# Patient Record
Sex: MALE | Race: WHITE
[De-identification: names, ages, dates, MRNs, and addresses within clinical notes are randomized per-mention and may not be internally consistent; named-entity substitution may affect disease eponyms.]

---

## 2017-10-30 ENCOUNTER — HOSPITAL ENCOUNTER (INPATIENT)
Dept: HOSPITAL 31 - C.ER | Age: 62
LOS: 44 days | Discharge: HOME | DRG: 177 | End: 2017-12-13
Attending: INTERNAL MEDICINE | Admitting: INTERNAL MEDICINE
Payer: MEDICAID

## 2017-10-30 DIAGNOSIS — J18.9: ICD-10-CM

## 2017-10-30 DIAGNOSIS — A15.7: Primary | ICD-10-CM

## 2017-10-30 DIAGNOSIS — I10: ICD-10-CM

## 2017-10-30 DIAGNOSIS — F43.22: ICD-10-CM

## 2017-10-30 DIAGNOSIS — Z87.891: ICD-10-CM

## 2017-10-30 DIAGNOSIS — J45.909: ICD-10-CM

## 2017-10-30 DIAGNOSIS — K80.20: ICD-10-CM

## 2017-10-30 DIAGNOSIS — Z78.9: ICD-10-CM

## 2017-10-30 DIAGNOSIS — R04.2: ICD-10-CM

## 2017-10-30 LAB
ALBUMIN/GLOB SERPL: 1.1 {RATIO} (ref 1–2.1)
ALP SERPL-CCNC: 82 U/L (ref 38–126)
ALT SERPL-CCNC: 34 U/L (ref 21–72)
APTT BLD: 38 SECONDS (ref 21–34)
AST SERPL-CCNC: 33 U/L (ref 17–59)
BASE EXCESS BLDV CALC-SCNC: -1.3 MMOL/L (ref 0–2)
BASE EXCESS BLDV CALC-SCNC: 2.4 MMOL/L (ref 0–2)
BASOPHILS # BLD AUTO: 0.1 K/UL (ref 0–0.2)
BASOPHILS NFR BLD: 0.7 % (ref 0–2)
BILIRUB SERPL-MCNC: 0.8 MG/DL (ref 0.2–1.3)
BILIRUB UR-MCNC: NEGATIVE MG/DL
BUN SERPL-MCNC: 17 MG/DL (ref 9–20)
CALCIUM SERPL-MCNC: 9.2 MG/DL (ref 8.6–10.4)
CAOX CRY #/AREA URNS HPF: (no result) /HPF
CHLORIDE SERPL-SCNC: 96 MMOL/L (ref 98–107)
CO2 SERPL-SCNC: 23 MMOL/L (ref 22–30)
EOSINOPHIL # BLD AUTO: 0 K/UL (ref 0–0.7)
EOSINOPHIL NFR BLD: 0 % (ref 0–4)
ERYTHROCYTE [DISTWIDTH] IN BLOOD BY AUTOMATED COUNT: 13.1 % (ref 11.5–14.5)
GLOBULIN SER-MCNC: 3.8 GM/DL (ref 2.2–3.9)
GLUCOSE SERPL-MCNC: 111 MG/DL (ref 75–110)
GLUCOSE UR STRIP-MCNC: NORMAL MG/DL
HCT VFR BLD CALC: 37.7 % (ref 35–51)
HYALINE CASTS #/AREA URNS LPF: (no result) /LPF (ref 0–2)
INR PPP: 1.2
KETONES UR STRIP-MCNC: NEGATIVE MG/DL
LEUKOCYTE ESTERASE UR-ACNC: (no result) LEU/UL
LYMPHOCYTES # BLD AUTO: 1.4 K/UL (ref 1–4.3)
LYMPHOCYTES NFR BLD AUTO: 11.3 % (ref 20–40)
MAGNESIUM SERPL-MCNC: 1.8 MG/DL (ref 1.6–2.3)
MCH RBC QN AUTO: 29.5 PG (ref 27–31)
MCHC RBC AUTO-ENTMCNC: 34.4 G/DL (ref 33–37)
MCV RBC AUTO: 85.6 FL (ref 80–94)
MONOCYTES # BLD: 1.2 K/UL (ref 0–0.8)
MONOCYTES NFR BLD: 10 % (ref 0–10)
NRBC BLD AUTO-RTO: 0 % (ref 0–2)
PCO2 BLDV: 32 MMHG (ref 40–60)
PCO2 BLDV: 43 MMHG (ref 40–60)
PH BLDV: 7.36 [PH] (ref 7.32–7.43)
PH BLDV: 7.5 [PH] (ref 7.32–7.43)
PH UR STRIP: 5 [PH] (ref 5–8)
PHOSPHATE SERPL-MCNC: 1.4 MG/DL (ref 2.5–4.5)
PLATELET # BLD: 323 K/UL (ref 130–400)
PMV BLD AUTO: 7.6 FL (ref 7.2–11.7)
POTASSIUM SERPL-SCNC: 3.4 MMOL/L (ref 3.6–5.2)
PROT SERPL-MCNC: 8.2 G/DL (ref 6.3–8.3)
PROT UR STRIP-MCNC: NEGATIVE MG/DL
RBC # UR STRIP: (no result) /UL
RBC #/AREA URNS HPF: 1 /HPF (ref 0–3)
SODIUM SERPL-SCNC: 131 MMOL/L (ref 132–148)
SP GR UR STRIP: 1 (ref 1–1.03)
UROBILINOGEN UR-MCNC: NORMAL MG/DL (ref 0.2–1)
WBC # BLD AUTO: 12.3 K/UL (ref 4.8–10.8)

## 2017-10-30 RX ADMIN — ALBUTEROL SULFATE PRN MG: 2.5 SOLUTION RESPIRATORY (INHALATION) at 21:45

## 2017-10-30 RX ADMIN — TAZOBACTAM SODIUM AND PIPERACILLIN SODIUM SCH MLS/HR: 375; 3 INJECTION, SOLUTION INTRAVENOUS at 21:48

## 2017-10-30 NOTE — C.PDOC
History Of Present Illness


62 year old male presents to the ED for evaluation of cough and hemoptysis 

which began 2 days ago. Patient notes he had multiple episodes, with last 

episode occurring prior to arrival. Patient notes bright red blood with clots. 

He reports shortness of breath. Patient denies history of asthma or smoking. 

Patient has been compliant with his blood pressure medication. Patient denies 

unintentional weight loss, TB exposure, or recent foreign travel. 





COUGH, HEMOPTYSIS X 2 DAYS. PS MULT EPISODES LAST PTA. +BRB W CLOTS. +SOB. 

DENIES HO ASTHMA, SMOKING. COMPLIANT W BP MEDS. DENIES UNINT WT LOSS, TB 

EXPOSURE, FOREIGN TRAVEL.





EXAM


MOD DIST NONTOXIC


HEENT NEG


LUNGS +ACTIVE WET COUGH, NP. +DIFFUSE RHONCHI R SIDE W TACHYPNEA RETRACTION


CV RRR SINUS TACH


REMAINDER NEG


Time Seen by Provider: 10/30/17 12:24


Chief Complaint (Nursing): Cough, Cold, Congestion


History Per: Patient


History/Exam Limitations: no limitations


Onset/Duration Of Symptoms: Days (2)


Current Symptoms Are (Timing): Still Present


Sick Contacts (Context): None


Associated Symptoms: Cough


Ear Symptoms: Bilateral: None


Additional History Per: Patient





Past Medical History


Reviewed: Historical Data, Nursing Documentation, Vital Signs


Vital Signs: 


 Last Vital Signs











Temp  99.8 F H  10/30/17 17:26


 


Pulse  95 H  10/30/17 17:26


 


Resp  20   10/30/17 17:26


 


BP  146/81   10/30/17 17:26


 


Pulse Ox  99   10/30/17 17:26














- Medical History


PMH: No Chronic Diseases


Surgical History: No Surg Hx


Family History: States: Unknown Family Hx





- Social History


Hx Tobacco Use: No





Review Of Systems


Constitutional: Negative for: Weight loss


Respiratory: Positive for: Cough, Hemoptysis





Physical Exam





- Physical Exam


Appears: Non-toxic, Other (in moderate distress )


Skin: Normal Color, Warm, Dry


Head: Atraumatic, Normacephalic


Eye(s): bilateral: Normal Inspection


Ear(s): Bilateral: Normal


Nose: Normal, No Discharge


Oral Mucosa: Moist


Throat: Normal, No Erythema, No Exudate


Neck: Supple


Chest: Symmetrical, No Deformity, No Tenderness, Other (sinus tachycardia )


Cardiovascular: Rhythm Regular, No Murmur


Respiratory: No Rales, Rhonchi (diffuse, right side ), No Wheezing, Other (

active wet cough, nonproductive, tachypnea, retraction )


Extremity: Normal ROM, Capillary Refill (less than 2 seconds )


Neurological/Psych: Oriented x3, Normal Speech, Normal Cognition


Gait: Steady





ED Course And Treatment





- Laboratory Results


Result Diagrams: 


 10/30/17 12:59





 10/30/17 12:59


ECG: Interpreted By Me


ECG Rhythm: Sinus Rhythm


Rate From EC


O2 Sat by Pulse Oximetry: 94


Pulse Ox Interpretation: Normal





- Radiology


CXR: Interpreted by Me, Viewed By Me, Read By Radiologist


CXR Interpretation: Yes: Other (patchy infiltrates bilaterally )


Progress Note: Labs, CT Angio Chest, CXR, EKG ordered and reviewed. Albuterol 

INH, Maxipime IVP, Tylenol PO and IV fluids administered.





Progress





- Re-Evaluation


Re-evaluation Note: 





10/30/17 17:49


NARD. 98% ON RA. APPEARS COMFORTABLE. 


D/W DR DEMI XIE WILL ADMIT. PENDING CTA REPORT





- Data Reviewed


Data Reviewed: Lab, Diagnostic imaging, EKG, Old records





- Critical Care


Citical Care: Excluding Proc Time


Critical Care Time: 90 minutes





Disposition


Counseled Patient/Family Regarding: Studies Performed, Diagnosis





- Disposition


Disposition: HOSPITALIZED


Disposition Time: 17:52


Condition: STABLE


Forms:  [employer] (English)





- Clinical Impression


Clinical Impression: 


 Pneumonia, Hemoptysis








- Scribe Statement


The provider has reviewed the documentation as recorded by the Scribe (Marta Walsh)


Provider Attestation: 








All medical record entries made by the Scribe were at my direction and 

personally dictated by me. I have reviewed the chart and agree that the record 

accurately reflects my personal performance of the history, physical exam, 

medical decision making, and the department course for this patient. I have 

also personally directed, reviewed, and agree with the discharge instructions 

and disposition.





Decision To Admit





- Pt Status Changed To:


Hospital Disposition Of: Inpatient





- Admit Certification


Admit to Inpatient:: After my assessment, the patient will require 

hospitalization for at least two midnights.  This is because of the severity of 

symptoms shown, intensity of services needed, and/or the medical risk in this 

patient being treated as an outpatient.





- InPatient:


Physician Admission Certification: I certify that this patient requires 2 or 

more midnights of care for the following reason:: SEE NOTE





- .


Bed Request Type: Regular


Admitting Physician: Turner Xie


Patient Diagnosis: 


 Pneumonia, Hemoptysis

## 2017-10-30 NOTE — CT
PROCEDURE:  CT Chest with contrast (Pulmonary Angiogram)



HISTORY:

HEMOPTYSIS  r/o PE



COMPARISON:

None available. 



TECHNIQUE:

Axial computed tomography images were obtained of the chest in the 

pulmonary arterial phase of enhancement. Coronal and sagittal 

reformatted images were created and reviewed.



Intravenous contrast dose: Visipaque 320 100 mL.



Mean Hounsfield unit values in the main pulmonary artery: 245.83 



Radiation dose:



Total exam DLP = 363.92 mGy-cm.



This CT exam was performed using one or more of the following dose 

reduction techniques: Automated exposure control, adjustment of the 

mA and/or kV according to patient size, and/or use of iterative 

reconstruction technique.



FINDINGS:



PULMONARY ARTERIES:

Unremarkable. No pulmonary embolism. 



AORTA:

No acute findings. No thoracic aortic aneurysm. 



LUNGS:

Large, dominant thick-walled cavitary mass apex of the right lung 

with an air-fluid level.  The major component measures 3.4 x 4.2 cm 

in their satellite lesions contiguous with this. Additional benign 

cavitary masses identified in the left upper lobe, superior segment 

of the left lower lobe, and in the posterior segment of the right 

upper lobe. The patient is neither cachectic hand is not known to be 

immunosuppressive medication, nor immunocompromised. Although tumor, 

septic emboli are less likely considerations in all likelihood this 

is inflammatory/infectious perhaps granulomatous disease. 

Reactivation tuberculosis can assume this appearance as CT and 

variants of sarcoid. 



PLEURAL SPACES:

Unremarkable. No effusion or pneuomothorax. 



HEART:

Unremarkable. No cardiomegaly. No significant pericardial effusion. 



LYMPH NODES:

No lymphadenopathy.



BONES, CHEST WALL:

Unremarkable. No fracture or destructive lesion 



OTHER FINDINGS:

Cholelithiasis without CT evidence of acute cholecystitis.  



IMPRESSION:

Primarily upper lobe masses and infiltrates including a dominant 

cavitary mass in the right upper lobe. Strong consideration to 

granulomatous disease/ reactivation tuberculosis. Less likely 

considerations have been provided above.



Cholelithiasis without CT evidence of acute cholecystitis. 



Communication of results: I discussed findings with the attending 

physician in the emergency department at the time of this 

interpretation.

## 2017-10-30 NOTE — RAD
HISTORY:

Sepsis Patient  



COMPARISON:

No prior. 



FINDINGS:



LUNGS:

Limited patchy density is difficult to exclude from bony overlap at 

the left apex.  Clinically correlate further. Follow-up PA and 

lateral radiographs is recommended possible it otherwise CT may be 

helpful.



PLEURA:

No significant pleural effusion identified, no pneumothorax apparent.



CARDIOVASCULAR:

Normal.



OSSEOUS STRUCTURES:

Old healed right 8th rib fracture seen posteriorly.



VISUALIZED UPPER ABDOMEN:

Normal.



OTHER FINDINGS:

None.



IMPRESSION:

Rostral patchy infiltrate left apex versus bony overlap or even bony 

abnormality at the left for 2nd and 3rd ribs. Consider follow-up 

two-view chest radiography or CT. No potential acute right pulmonary 

findings.

## 2017-10-31 LAB
L PNEUMO1 AG UR QL IA: NEGATIVE
PROCALCITONIN SERPL-MCNC: 0.56 NG/ML (ref 0.19–0.49)

## 2017-10-31 RX ADMIN — ALBUTEROL SULFATE PRN MG: 2.5 SOLUTION RESPIRATORY (INHALATION) at 13:24

## 2017-10-31 RX ADMIN — ALBUTEROL SULFATE PRN MG: 2.5 SOLUTION RESPIRATORY (INHALATION) at 07:49

## 2017-10-31 RX ADMIN — TAZOBACTAM SODIUM AND PIPERACILLIN SODIUM SCH MLS/HR: 375; 3 INJECTION, SOLUTION INTRAVENOUS at 05:28

## 2017-10-31 RX ADMIN — TAZOBACTAM SODIUM AND PIPERACILLIN SODIUM SCH MLS/HR: 375; 3 INJECTION, SOLUTION INTRAVENOUS at 12:48

## 2017-10-31 RX ADMIN — TAZOBACTAM SODIUM AND PIPERACILLIN SODIUM SCH MLS/HR: 375; 3 INJECTION, SOLUTION INTRAVENOUS at 21:16

## 2017-10-31 NOTE — CP.PCM.CON
<Supriya Ortega - Last Filed: 10/31/17 14:37>





History of Present Illness





- History of Present Illness


History of Present Illness: 





Pulmonology Consult Note for Dr. Mathew's Service





Reason for consult: Abnormal CXR, hemoptysis 





HPI: 62M with PMHx of HTN presented to the ED for hemoptysis, fever, chills 

that started 10/29/17. He reports having a productive cough x 2 weeks but 

denied any fever, chills. Denied any recent travel or sick contacts. Patient 

admitted to fever, chills, fatigue, headache, wheezing, shortness of breath, 

5lb weight loss over the past month - unintentional. He has never had TB, 

pneumonia in the past, follows closely with PMD. Denied chest pain, abdominal 

pain, n/v/d/c, or urinary symptoms. 





PMHx: HTN


PSHx: Denied


Meds: Denied


All: NKDA


SHx: Denied tobacco, ETOH, and illicit drug use


FHx: Unremarkable 











Past Patient History





- Past Social History


Smoking Status: Never Smoked





- CARDIAC


Hx Cardiac Disorders: Yes


Hx Hypertension: Yes





- PULMONARY


Hx Respiratory Disorders: Yes


Hx Asthma: Yes





- MUSCULOSKELETAL/RHEUMATOLOGICAL


Hx Falls: No





- PSYCHIATRIC


Hx Substance Use: No





- SURGICAL HISTORY


Hx Surgeries: No





Meds


Allergies/Adverse Reactions: 


 Allergies











Allergy/AdvReac Type Severity Reaction Status Date / Time


 


No Known Allergies Allergy   Verified 10/30/17 12:03














- Medications


Medications: 


 Current Medications





Acetaminophen (Tylenol 325mg Tab)  975 mg PO ONCE PRN


   PRN Reason: Fever >100.4 F


   Last Admin: 10/30/17 19:34 Dose:  975 mg


Acetaminophen (Tylenol 325mg Tab)  650 mg PO Q4 [facility]


   Last Admin: 10/31/17 09:07 Dose:  650 mg


Albuterol Sulfate (Albuterol 0.083% Inhal Sol (2.5 Mg/3 Ml) Ud)  2.5 mg INH RQ6 

PRN


   PRN Reason: Shortness of Breath


   Last Admin: 10/31/17 07:49 Dose:  2.5 mg


Piperacillin Sod/Tazobactam Sod (Zosyn 3.375 Gm Iv Premix)  3.375 gm in 50 mls 

@ 100 mls/hr IVPB Q8H [facility]


   Last Admin: 10/31/17 05:28 Dose:  100 mls/hr


Losartan Potassium (Cozaar)  50 mg PO DAILY [facility]


   Last Admin: 10/31/17 09:07 Dose:  50 mg











Physical Exam





- Constitutional


Appears: No Acute Distress





- Head Exam


Head Exam: NORMAL INSPECTION, NORMOCEPHALIC





- Eye Exam


Eye Exam: EOMI, Normal appearance, PERRL


Pupil Exam: NORMAL ACCOMODATION





- ENT Exam


ENT Exam: Mucous Membranes Dry





- Neck Exam


Neck exam: Positive for: Normal Inspection.  Negative for: Lymphadenopathy, 

Thyromegaly





- Respiratory Exam


Respiratory Exam: Prolonged Expiratory Phase, Wheezes





- Cardiovascular Exam


Cardiovascular Exam: Tachycardia





- GI/Abdominal Exam


GI & Abdominal Exam: Normal Bowel Sounds, Soft.  absent: Distended, Tenderness





- Extremities Exam


Extremities exam: Positive for: normal inspection, pedal pulses present.  

Negative for: pedal edema, tenderness





- Back Exam


Back exam: NORMAL INSPECTION





- Neurological Exam


Neurological exam: Alert, CN II-XII Intact, Oriented x3





- Psychiatric Exam


Psychiatric exam: Normal Affect, Normal Mood





- Skin


Skin Exam: Dry, Intact, Normal Color, Warm





Results





- Vital Signs


Recent Vital Signs: 


 Last Vital Signs











Temp  99.3 F   10/31/17 00:33


 


Pulse  86   10/31/17 08:06


 


Resp  20   10/31/17 00:33


 


BP  121/71   10/31/17 00:33


 


Pulse Ox  95   10/31/17 00:33














- Labs


Result Diagrams: 


 10/30/17 12:59





 10/30/17 12:59


Labs: 


 Laboratory Results - last 24 hr











  10/30/17 10/30/17 10/30/17





  12:46 12:53 12:59


 


WBC    12.3 H


 


RBC    4.40


 


Hgb    13.0


 


Hct    37.7


 


MCV    85.6


 


MCH    29.5


 


MCHC    34.4


 


RDW    13.1


 


Plt Count    323


 


MPV    7.6


 


Neut % (Auto)    78.0 H


 


Lymph % (Auto)    11.3 L


 


Mono % (Auto)    10.0


 


Eos % (Auto)    0.0


 


Baso % (Auto)    0.7


 


Neut #    9.6 H


 


Lymph #    1.4


 


Mono #    1.2 H


 


Eos #    0.0


 


Baso #    0.1


 


PT   


 


INR   


 


APTT   


 


pO2   57 H 


 


VBG pH   7.50 H 


 


VBG pCO2   32 L 


 


VBG HCO3   26.6 


 


VBG Total CO2   26.0 


 


VBG O2 Sat (Calc)   94.3 H 


 


VBG Base Excess   2.4 H 


 


VBG Potassium   3.4 L 


 


Sodium   133.0 


 


Chloride   103.0 


 


Glucose   124 H 


 


Lactate   0.9 


 


Potassium   


 


Carbon Dioxide   


 


Anion Gap   


 


BUN   


 


Creatinine   


 


Est GFR ( Amer)   


 


Est GFR (Non-Af Amer)   


 


Random Glucose   


 


Calcium   


 


Phosphorus   


 


Magnesium   


 


Total Bilirubin   


 


AST   


 


ALT   


 


Alkaline Phosphatase   


 


Troponin I   


 


NT-Pro-B Natriuret Pep   


 


Total Protein   


 


Albumin   


 


Globulin   


 


Albumin/Globulin Ratio   


 


Venous Blood Potassium   3.4 L 


 


Urine Color   


 


Urine Clarity   


 


Urine pH   


 


Ur Specific Gravity   


 


Urine Protein   


 


Urine Glucose (UA)   


 


Urine Ketones   


 


Urine Blood   


 


Urine Nitrate   


 


Urine Bilirubin   


 


Urine Urobilinogen   


 


Ur Leukocyte Esterase   


 


Urine RBC (Auto)   


 


Calcium Oxalate Crystal   


 


Hyaline Casts   


 


Influenza Typ A,B (EIA)  Negative for flu a/b  














  10/30/17 10/30/17 10/30/17





  12:59 12:59 14:42


 


WBC   


 


RBC   


 


Hgb   


 


Hct   


 


MCV   


 


MCH   


 


MCHC   


 


RDW   


 


Plt Count   


 


MPV   


 


Neut % (Auto)   


 


Lymph % (Auto)   


 


Mono % (Auto)   


 


Eos % (Auto)   


 


Baso % (Auto)   


 


Neut #   


 


Lymph #   


 


Mono #   


 


Eos #   


 


Baso #   


 


PT  13.8 H  


 


INR  1.2  


 


APTT  38 H  


 


pO2   


 


VBG pH   


 


VBG pCO2   


 


VBG HCO3   


 


VBG Total CO2   


 


VBG O2 Sat (Calc)   


 


VBG Base Excess   


 


VBG Potassium   


 


Sodium   131 L 


 


Chloride   96 L 


 


Glucose   


 


Lactate   


 


Potassium   3.4 L 


 


Carbon Dioxide   23 


 


Anion Gap   16 


 


BUN   17 


 


Creatinine   1.1 


 


Est GFR ( Amer)   > 60 


 


Est GFR (Non-Af Amer)   > 60 


 


Random Glucose   111 H 


 


Calcium   9.2 


 


Phosphorus   1.4 L 


 


Magnesium   1.8 


 


Total Bilirubin   0.8 


 


AST   33 


 


ALT   34 


 


Alkaline Phosphatase   82 


 


Troponin I   < 0.0120 


 


NT-Pro-B Natriuret Pep   249 


 


Total Protein   8.2 


 


Albumin   4.3 


 


Globulin   3.8 


 


Albumin/Globulin Ratio   1.1 


 


Venous Blood Potassium   


 


Urine Color    Straw


 


Urine Clarity    Clear


 


Urine pH    5.0


 


Ur Specific Gravity    1.005


 


Urine Protein    Negative


 


Urine Glucose (UA)    Normal


 


Urine Ketones    Negative


 


Urine Blood    1+ H


 


Urine Nitrate    Negative


 


Urine Bilirubin    Negative


 


Urine Urobilinogen    Normal


 


Ur Leukocyte Esterase    Neg


 


Urine RBC (Auto)    1


 


Calcium Oxalate Crystal    Occ H


 


Hyaline Casts    0-2


 


Influenza Typ A,B (EIA)   














  10/30/17





  17:20


 


WBC 


 


RBC 


 


Hgb 


 


Hct 


 


MCV 


 


MCH 


 


MCHC 


 


RDW 


 


Plt Count 


 


MPV 


 


Neut % (Auto) 


 


Lymph % (Auto) 


 


Mono % (Auto) 


 


Eos % (Auto) 


 


Baso % (Auto) 


 


Neut # 


 


Lymph # 


 


Mono # 


 


Eos # 


 


Baso # 


 


PT 


 


INR 


 


APTT 


 


pO2  31


 


VBG pH  7.36


 


VBG pCO2  43


 


VBG HCO3  22.8


 


VBG Total CO2  25.6


 


VBG O2 Sat (Calc)  67.3 H


 


VBG Base Excess  -1.3 L


 


VBG Potassium  3.3 L


 


Sodium  138.0


 


Chloride  106.0


 


Glucose  112 H


 


Lactate  1.5


 


Potassium 


 


Carbon Dioxide 


 


Anion Gap 


 


BUN 


 


Creatinine 


 


Est GFR ( Amer) 


 


Est GFR (Non-Af Amer) 


 


Random Glucose 


 


Calcium 


 


Phosphorus 


 


Magnesium 


 


Total Bilirubin 


 


AST 


 


ALT 


 


Alkaline Phosphatase 


 


Troponin I 


 


NT-Pro-B Natriuret Pep 


 


Total Protein 


 


Albumin 


 


Globulin 


 


Albumin/Globulin Ratio 


 


Venous Blood Potassium  3.3 L


 


Urine Color 


 


Urine Clarity 


 


Urine pH 


 


Ur Specific Gravity 


 


Urine Protein 


 


Urine Glucose (UA) 


 


Urine Ketones 


 


Urine Blood 


 


Urine Nitrate 


 


Urine Bilirubin 


 


Urine Urobilinogen 


 


Ur Leukocyte Esterase 


 


Urine RBC (Auto) 


 


Calcium Oxalate Crystal 


 


Hyaline Casts 


 


Influenza Typ A,B (EIA) 














Assessment & Plan





- Assessment and Plan (Free Text)


Plan: 





Suspected Tuberculosis


Continue current management - will continue to monitor  





Imaging:


 CXR 10/30: Rostral patchy infiltrate left apex versus bony overlap or even 

bony abnormality at the left for 2nd and 3rd ribs. Consider follow-up two-view 

chest radiography or CT. No potential acute right pulmonary findings.





 CT Chest 10/30: Primarily upper lobe masses and infiltrates including a 

dominant cavitary mass in the right upper lobe. Strong consideration to 

granulomatous disease/ reactivation tuberculosis. Less likely considerations 

have been provided above.Cholelithiasis without CT evidence of acute 

cholecystitis. 





Medications:


 Zosyn by primary started 10/31


 Isoniazid, Rifampin, Pyridoxine, Pyrazinamide - started 10/31 by primary and 

ID - Dr. Ross 





Results:


 Leukocytosis, with left shift 


 HIV NEGATIVE 


 F/U quantiferon, rapid flu, Mycoplasma Igm, strep pneumo, legionella, 

procalcitonin, HIV, AFBs





Hx HTN





DW Shannon Da Silva DO, PGY-1





<Rufus Mathew - Last Filed: 10/31/17 18:41>





Meds





- Medications


Medications: 


 Current Medications





Acetaminophen (Tylenol 325mg Tab)  975 mg PO ONCE PRN


   PRN Reason: Fever >100.4 F


   Last Admin: 10/30/17 19:34 Dose:  975 mg


Acetaminophen (Tylenol 325mg Tab)  650 mg PO Q4 PRN


   PRN Reason: Pain, Mild (1-3)


Albuterol Sulfate (Albuterol 0.083% Inhal Sol (2.5 Mg/3 Ml) Ud)  2.5 mg INH RQ6 

PRN


   PRN Reason: Shortness of Breath


   Last Admin: 10/31/17 13:24 Dose:  2.5 mg


Ethambutol HCl (Myambutol)  800 mg PO DAILY [facility]


   Last Admin: 10/31/17 10:59 Dose:  800 mg


Piperacillin Sod/Tazobactam Sod (Zosyn 3.375 Gm Iv Premix)  3.375 gm in 50 mls 

@ 100 mls/hr IVPB Q8H [facility]


   Last Admin: 10/31/17 12:48 Dose:  100 mls/hr


Isoniazid (Niazid)  300 mg PO DAILY [facility]


   Last Admin: 10/31/17 10:59 Dose:  300 mg


Losartan Potassium (Cozaar)  50 mg PO DAILY [facility]


   Last Admin: 10/31/17 09:07 Dose:  50 mg


Pyrazinamide (Pyrazinamide)  1,000 mg PO DAILY [facility]


Pyridoxine HCl (Vitamin B6 50 Mg Tab)  50 mg PO DAILY [facility]


   Last Admin: 10/31/17 10:59 Dose:  50 mg


Rifampin (Rifampin Cap)  600 mg PO DAILY [facility]











Results





- Vital Signs


Recent Vital Signs: 


 Last Vital Signs











Temp  99.3 F   10/31/17 00:33


 


Pulse  86   10/31/17 08:06


 


Resp  20   10/31/17 00:33


 


BP  121/71   10/31/17 00:33


 


Pulse Ox  95   10/31/17 00:33














- Labs


Result Diagrams: 


 10/30/17 12:59





 10/30/17 12:59


Labs: 


 Laboratory Results - last 24 hr











  10/31/17 10/31/17





  11:46 11:49


 


HIV 1&2 Antibody Screen  Negative 


 


Ur L.pneumophila Ag   Negative














Attending/Attestation





- Attestation


I have personally seen and examined this patient.: Yes


I have fully participated in the care of the patient.: Yes


I have reviewed all pertinent clinical information: Yes

## 2017-10-31 NOTE — CP.PCM.HP
History of Present Illness





- History of Present Illness


History of Present Illness: 





CC: Coughing blood





61 y/o males with HTN. Patoient seen for HTN and develop cough. 


Pt did not do OP- CXR c/o did not believe he has something wrong.


Pt has constant cough w/ bloody mucus x 2 days. He went to ER and admitted





Present on Admission





- Present on Admission


Any Indicators Present on Admission: Yes


History of DVT/PE: No


History of Uncontrolled Diabetes: No


Urinary Catheter: No


Decubitus Ulcer Present: No





Review of Systems





- Review of Systems


Systems not reviewed;Unavailable: Acuity of Condition





- Constitutional


Constitutional: absent: Anorexia, Excessive Sweating, Headache, Night Sweats, 

Sleep Apnea, Weight Loss, Weakness





- EENT


Eyes: absent: Diplopia, Loss of Peripheral Vision, Pain, Spots in Vision, Loss 

of Vision


Ears: absent: Decreased Hearing, Disequilibrium, Dizziness


Nose/Mouth/Throat: absent: Nasal Congestion, Nasal Trauma, Bleeding Gums, 

Dysphagia, Halitosis





- Cardiovascular


Cardiovascular: absent: Chest Pain, Diaphoresis, Dyspnea, Dyspnea on Exertion, 

Edema, Leg Edema, Orthopnea





- Respiratory


Respiratory: Hemoptysis, Wheezing, Chest Congestion, Excessive Mucous 

Production.  absent: Dyspnea on Exertion, Snoring, Pain with Coughing





- Gastrointestinal


Gastrointestinal: absent: Abdominal Pain, Dyspepsia, Dysphagia, Heartburn, 

Nausea





- Genitourinary


Genitourinary: absent: Difficulty Urinating, Nocturia, Urinary Hesitance, 

Urinary Urgency





- Musculoskeletal


Musculoskeletal: absent: Abnormal Gait, Back Pain, Loss of Height, Myalgias, 

Neck Pain





- Integumentary


Integumentary: absent: Alopecia, Hirsutism, Pruritus, Rash





- Neurological


Neurological: absent: Abnormal Gait, Abnormal Movements, Behavioral Changes, 

Burning Sensations, Confusion, Focal Weakness





Past Patient History





- Past Social History


Smoking Status: Former Smoker





- CARDIAC


Hx Cardiac Disorders: Yes


Hx Hypertension: Yes





- PULMONARY


Hx Respiratory Disorders: Yes


Hx Asthma: Yes





- MUSCULOSKELETAL/RHEUMATOLOGICAL


Hx Falls: No





- PSYCHIATRIC


Hx Substance Use: No





- SURGICAL HISTORY


Hx Surgeries: No





Meds


Allergies/Adverse Reactions: 


 Allergies











Allergy/AdvReac Type Severity Reaction Status Date / Time


 


No Known Allergies Allergy   Verified 10/30/17 12:03














Physical Exam





- Constitutional


Appears: No Acute Distress





- Eye Exam


Eye Exam: Normal appearance





- ENT Exam


ENT Exam: Mucous Membranes Moist





- Neck Exam


Neck exam: Positive for: Full Rom.  Negative for: Lymphadenopathy, Thyromegaly





- Respiratory Exam


Respiratory Exam: Chest Wall Tenderness.  absent: Rhonchi, Wheezes





- Cardiovascular Exam


Cardiovascular Exam: REGULAR RHYTHM, +S1, +S2.  absent: Gallop, JVD, Systolic 

Murmur





- GI/Abdominal Exam


GI & Abdominal Exam: Soft.  absent: Guarding, Tenderness





- Extremities Exam


Extremities exam: Positive for: full ROM, normal capillary refill.  Negative for

: calf tenderness, joint swelling, pedal edema, pedal pulses present





Results





- Vital Signs


Recent Vital Signs: 





 Last Vital Signs











Temp  99.3 F   10/31/17 00:33


 


Pulse  86   10/31/17 08:06


 


Resp  20   10/31/17 00:33


 


BP  121/71   10/31/17 00:33


 


Pulse Ox  95   10/31/17 00:33














- Labs


Result Diagrams: 


 10/30/17 12:59





 10/30/17 12:59


Labs: 





 Laboratory Results - last 24 hr











  10/30/17 10/30/17 10/30/17





  12:46 12:53 12:59


 


WBC    12.3 H


 


RBC    4.40


 


Hgb    13.0


 


Hct    37.7


 


MCV    85.6


 


MCH    29.5


 


MCHC    34.4


 


RDW    13.1


 


Plt Count    323


 


MPV    7.6


 


Neut % (Auto)    78.0 H


 


Lymph % (Auto)    11.3 L


 


Mono % (Auto)    10.0


 


Eos % (Auto)    0.0


 


Baso % (Auto)    0.7


 


Neut #    9.6 H


 


Lymph #    1.4


 


Mono #    1.2 H


 


Eos #    0.0


 


Baso #    0.1


 


PT   


 


INR   


 


APTT   


 


pO2   57 H 


 


VBG pH   7.50 H 


 


VBG pCO2   32 L 


 


VBG HCO3   26.6 


 


VBG Total CO2   26.0 


 


VBG O2 Sat (Calc)   94.3 H 


 


VBG Base Excess   2.4 H 


 


VBG Potassium   3.4 L 


 


Sodium   133.0 


 


Chloride   103.0 


 


Glucose   124 H 


 


Lactate   0.9 


 


Potassium   


 


Carbon Dioxide   


 


Anion Gap   


 


BUN   


 


Creatinine   


 


Est GFR ( Amer)   


 


Est GFR (Non-Af Amer)   


 


Random Glucose   


 


Calcium   


 


Phosphorus   


 


Magnesium   


 


Total Bilirubin   


 


AST   


 


ALT   


 


Alkaline Phosphatase   


 


Troponin I   


 


NT-Pro-B Natriuret Pep   


 


Total Protein   


 


Albumin   


 


Globulin   


 


Albumin/Globulin Ratio   


 


Venous Blood Potassium   3.4 L 


 


Urine Color   


 


Urine Clarity   


 


Urine pH   


 


Ur Specific Gravity   


 


Urine Protein   


 


Urine Glucose (UA)   


 


Urine Ketones   


 


Urine Blood   


 


Urine Nitrate   


 


Urine Bilirubin   


 


Urine Urobilinogen   


 


Ur Leukocyte Esterase   


 


Urine RBC (Auto)   


 


Calcium Oxalate Crystal   


 


Hyaline Casts   


 


Influenza Typ A,B (EIA)  Negative for flu a/b  














  10/30/17 10/30/17 10/30/17





  12:59 12:59 14:42


 


WBC   


 


RBC   


 


Hgb   


 


Hct   


 


MCV   


 


MCH   


 


MCHC   


 


RDW   


 


Plt Count   


 


MPV   


 


Neut % (Auto)   


 


Lymph % (Auto)   


 


Mono % (Auto)   


 


Eos % (Auto)   


 


Baso % (Auto)   


 


Neut #   


 


Lymph #   


 


Mono #   


 


Eos #   


 


Baso #   


 


PT  13.8 H  


 


INR  1.2  


 


APTT  38 H  


 


pO2   


 


VBG pH   


 


VBG pCO2   


 


VBG HCO3   


 


VBG Total CO2   


 


VBG O2 Sat (Calc)   


 


VBG Base Excess   


 


VBG Potassium   


 


Sodium   131 L 


 


Chloride   96 L 


 


Glucose   


 


Lactate   


 


Potassium   3.4 L 


 


Carbon Dioxide   23 


 


Anion Gap   16 


 


BUN   17 


 


Creatinine   1.1 


 


Est GFR ( Amer)   > 60 


 


Est GFR (Non-Af Amer)   > 60 


 


Random Glucose   111 H 


 


Calcium   9.2 


 


Phosphorus   1.4 L 


 


Magnesium   1.8 


 


Total Bilirubin   0.8 


 


AST   33 


 


ALT   34 


 


Alkaline Phosphatase   82 


 


Troponin I   < 0.0120 


 


NT-Pro-B Natriuret Pep   249 


 


Total Protein   8.2 


 


Albumin   4.3 


 


Globulin   3.8 


 


Albumin/Globulin Ratio   1.1 


 


Venous Blood Potassium   


 


Urine Color    Straw


 


Urine Clarity    Clear


 


Urine pH    5.0


 


Ur Specific Gravity    1.005


 


Urine Protein    Negative


 


Urine Glucose (UA)    Normal


 


Urine Ketones    Negative


 


Urine Blood    1+ H


 


Urine Nitrate    Negative


 


Urine Bilirubin    Negative


 


Urine Urobilinogen    Normal


 


Ur Leukocyte Esterase    Neg


 


Urine RBC (Auto)    1


 


Calcium Oxalate Crystal    Occ H


 


Hyaline Casts    0-2


 


Influenza Typ A,B (EIA)   














  10/30/17





  17:20


 


WBC 


 


RBC 


 


Hgb 


 


Hct 


 


MCV 


 


MCH 


 


MCHC 


 


RDW 


 


Plt Count 


 


MPV 


 


Neut % (Auto) 


 


Lymph % (Auto) 


 


Mono % (Auto) 


 


Eos % (Auto) 


 


Baso % (Auto) 


 


Neut # 


 


Lymph # 


 


Mono # 


 


Eos # 


 


Baso # 


 


PT 


 


INR 


 


APTT 


 


pO2  31


 


VBG pH  7.36


 


VBG pCO2  43


 


VBG HCO3  22.8


 


VBG Total CO2  25.6


 


VBG O2 Sat (Calc)  67.3 H


 


VBG Base Excess  -1.3 L


 


VBG Potassium  3.3 L


 


Sodium  138.0


 


Chloride  106.0


 


Glucose  112 H


 


Lactate  1.5


 


Potassium 


 


Carbon Dioxide 


 


Anion Gap 


 


BUN 


 


Creatinine 


 


Est GFR ( Amer) 


 


Est GFR (Non-Af Amer) 


 


Random Glucose 


 


Calcium 


 


Phosphorus 


 


Magnesium 


 


Total Bilirubin 


 


AST 


 


ALT 


 


Alkaline Phosphatase 


 


Troponin I 


 


NT-Pro-B Natriuret Pep 


 


Total Protein 


 


Albumin 


 


Globulin 


 


Albumin/Globulin Ratio 


 


Venous Blood Potassium  3.3 L


 


Urine Color 


 


Urine Clarity 


 


Urine pH 


 


Ur Specific Gravity 


 


Urine Protein 


 


Urine Glucose (UA) 


 


Urine Ketones 


 


Urine Blood 


 


Urine Nitrate 


 


Urine Bilirubin 


 


Urine Urobilinogen 


 


Ur Leukocyte Esterase 


 


Urine RBC (Auto) 


 


Calcium Oxalate Crystal 


 


Hyaline Casts 


 


Influenza Typ A,B (EIA) 














- EKG Data


EKG Interpreted by: Myself


EKG shows normal: Sinus rhythm


Rate: Tachycardia (Complete RBBB)





Assessment & Plan





- Assessment and Plan (Free Text)


Assessment: 





Pulm cavitary lesion/ Hemoptysis - like PTB r/o cancer


HTN





Cont Zozyn/ albuterol


Pulmonary consult


For AFB x 3 & on Isolation

## 2017-10-31 NOTE — CP.PCM.CON
History of Present Illness





- History of Present Illness


History of Present Illness: 





admitted for hemoptysis  r/o TB vs malignancy 








Review of Systems





- Review of Systems


All systems: reviewed and no additional remarkable complaints except





- Constitutional


Constitutional: As Per HPI





- EENT


Eyes: absent: As Per HPI, Blind Spots, Blurred Vision, Change in Vision, 

Decreased Night Vision, Diplopia, Discharge, Dry Eye, Exophthalmos, Floaters, 

Irritation, Itchy Eyes, Loss of Peripheral Vision, Pain, Photophobia, Requires 

Corrective Lenses, Sees Flashes, Spots in Vision, Tunnel Vision, Other Visual 

Disturbances, Loss of Vision, Other


Ears: absent: As Per HPI, Decreased Hearing, Ear Discharge, Ear Pain, Tinnitus, 

Abnormal Hearing, Disequilibrium, Dizziness, Other


Nose/Mouth/Throat: absent: As Per HPI, Epistaxis, Nasal Congestion, Nasal 

Discharge, Nasal Obstruction, Nasal Trauma, Nose Pain, Post Nasal Drip, Sinus 

Pain, Sinus Pressure, Bleeding Gums, Change in Voice, Dental Pain, Dry Mouth, 

Dysphagia, Halitosis, Hoarsness, Lip Swelling, Mouth Lesions, Mouth Pain, 

Odynophagia, Sore Throat, Throat Swelling, Tongue Swelling, Facial Pain, Neck 

Pain, Neck Mass, Other





- Cardiovascular


Cardiovascular: absent: As Per HPI, Acrocyanosis, Chest Pain, Chest Pain at Rest

, Chest Pain with Activity, Claudication, Diaphoresis, Dyspnea, Dyspnea on 

Exertion, Edema, Irregular Heart Rhythm, Pain Radiating to Arm/Neck/Jaw, Leg 

Edema, Leg Ulcers, Lightheadedness, Orthopnea, Palpitations, Paroxysmal 

Nocturnal Dyspnea, Pedal Edema, Radiating Pain, Rapid Heart Rate, Slow Heart 

Rate, Syncope, Other





- Respiratory


Respiratory: As Per HPI, Hemoptysis





- Gastrointestinal


Gastrointestinal: absent: As Per HPI, Abdominal Pain, Belching, Bloating, 

Change in Bowel Habits, Change in Stool Character, Coffee Ground Emesis, 

Constipation, Cramping, Diarrhea, Dyspepsia, Dysphagia, Early Satiety, 

Excessive Flatus, Fecal Incontinence, Heartburn, Hematemesis, Hematochezia, 

Loose Stools, Melena, Nausea, Odynophagia, Temesmus, Vomiting, Other





- Genitourinary


Genitourinary: absent: As Per HPI, Change in Urinary Stream, Difficulty 

Urinating, Dysuria, Flank Pain, Hematuria, Pyuria, Nocturia, Urinary 

Incontinence, Urinary Frequency, Urinary Hesitance, Urinary Urgency, Voiding 

Freq/Small Amts, Freq UTI, Hx Renal/Bladder Calculi, Hx /Renal Surgery, 

Bladder Distension, Other





- Musculoskeletal


Musculoskeletal: absent: As Per HPI, Abnormal Gait, Arthralgias, Atrophy, Back 

Pain, Deformity, Joint Swelling, Limited Range of Motion, Loss of Height, 

Muscle Cramps, Muscle Weakness, Myalgias, Neck Pain, Numbness, Radiating Pain 

into Limb, Stiffness, Tingling, Other





Past Patient History





- Past Social History


Smoking Status: Never Smoked





- CARDIAC


Hx Cardiac Disorders: Yes


Hx Hypertension: Yes





- PULMONARY


Hx Respiratory Disorders: Yes


Hx Asthma: Yes





- MUSCULOSKELETAL/RHEUMATOLOGICAL


Hx Falls: No





- PSYCHIATRIC


Hx Substance Use: No





- SURGICAL HISTORY


Hx Surgeries: No





Meds


Allergies/Adverse Reactions: 


 Allergies











Allergy/AdvReac Type Severity Reaction Status Date / Time


 


No Known Allergies Allergy   Verified 10/30/17 12:03














- Medications


Medications: 


 Current Medications





Acetaminophen (Tylenol 325mg Tab)  975 mg PO ONCE PRN


   PRN Reason: Fever >100.4 F


   Last Admin: 10/30/17 19:34 Dose:  975 mg


Acetaminophen (Tylenol 325mg Tab)  650 mg PO Q4 [facility]


   Last Admin: 10/31/17 09:07 Dose:  650 mg


Albuterol Sulfate (Albuterol 0.083% Inhal Sol (2.5 Mg/3 Ml) Ud)  2.5 mg INH RQ6 

PRN


   PRN Reason: Shortness of Breath


   Last Admin: 10/31/17 07:49 Dose:  2.5 mg


Piperacillin Sod/Tazobactam Sod (Zosyn 3.375 Gm Iv Premix)  3.375 gm in 50 mls 

@ 100 mls/hr IVPB Q8H [facility]


   Last Admin: 10/31/17 05:28 Dose:  100 mls/hr


Isoniazid (Niazid)  300 mg PO DAILY [facility]


Losartan Potassium (Cozaar)  50 mg PO DAILY [facility]


   Last Admin: 10/31/17 09:07 Dose:  50 mg


Pyridoxine HCl (Vitamin B6 50 Mg Tab)  50 mg PO DAILY [facility]


Rifampin (Rifampin)  450 mg PO DAILY [facility]











Physical Exam





- Constitutional


Appears: Non-toxic, Chronically Ill





- Head Exam


Head Exam: NORMOCEPHALIC





- Eye Exam


Eye Exam: PERRL





- ENT Exam


ENT Exam: Mucous Membranes Dry





- Neck Exam


Neck exam: Negative for: Lymphadenopathy





- Respiratory Exam


Respiratory Exam: Decreased Breath Sounds





- Cardiovascular Exam


Cardiovascular Exam: REGULAR RHYTHM





- GI/Abdominal Exam


GI & Abdominal Exam: Diminished Bowel Sounds, Soft





- Rectal Exam


Rectal Exam: Deferred





-  Exam


 Exam: NORMAL INSPECTION





- Extremities Exam


Extremities exam: Negative for: pedal edema





- Back Exam


Back exam: absent: CVA tenderness (L), CVA tenderness (R)





- Neurological Exam


Neurological exam: Alert, CN II-XII Intact, Oriented x3, Reflexes Normal





- Psychiatric Exam


Psychiatric exam: Normal Mood





Results





- Vital Signs


Recent Vital Signs: 


 Last Vital Signs











Temp  99.3 F   10/31/17 00:33


 


Pulse  86   10/31/17 08:06


 


Resp  20   10/31/17 00:33


 


BP  121/71   10/31/17 00:33


 


Pulse Ox  95   10/31/17 00:33














- Labs


Result Diagrams: 


 11/01/17 08:35





 11/01/17 07:13


Labs: 


 Laboratory Results - last 24 hr











  10/30/17 10/30/17 10/30/17





  12:46 12:53 12:59


 


WBC    12.3 H


 


RBC    4.40


 


Hgb    13.0


 


Hct    37.7


 


MCV    85.6


 


MCH    29.5


 


MCHC    34.4


 


RDW    13.1


 


Plt Count    323


 


MPV    7.6


 


Neut % (Auto)    78.0 H


 


Lymph % (Auto)    11.3 L


 


Mono % (Auto)    10.0


 


Eos % (Auto)    0.0


 


Baso % (Auto)    0.7


 


Neut #    9.6 H


 


Lymph #    1.4


 


Mono #    1.2 H


 


Eos #    0.0


 


Baso #    0.1


 


PT   


 


INR   


 


APTT   


 


pO2   57 H 


 


VBG pH   7.50 H 


 


VBG pCO2   32 L 


 


VBG HCO3   26.6 


 


VBG Total CO2   26.0 


 


VBG O2 Sat (Calc)   94.3 H 


 


VBG Base Excess   2.4 H 


 


VBG Potassium   3.4 L 


 


Sodium   133.0 


 


Chloride   103.0 


 


Glucose   124 H 


 


Lactate   0.9 


 


Potassium   


 


Carbon Dioxide   


 


Anion Gap   


 


BUN   


 


Creatinine   


 


Est GFR ( Amer)   


 


Est GFR (Non-Af Amer)   


 


Random Glucose   


 


Calcium   


 


Phosphorus   


 


Magnesium   


 


Total Bilirubin   


 


AST   


 


ALT   


 


Alkaline Phosphatase   


 


Troponin I   


 


NT-Pro-B Natriuret Pep   


 


Total Protein   


 


Albumin   


 


Globulin   


 


Albumin/Globulin Ratio   


 


Venous Blood Potassium   3.4 L 


 


Urine Color   


 


Urine Clarity   


 


Urine pH   


 


Ur Specific Gravity   


 


Urine Protein   


 


Urine Glucose (UA)   


 


Urine Ketones   


 


Urine Blood   


 


Urine Nitrate   


 


Urine Bilirubin   


 


Urine Urobilinogen   


 


Ur Leukocyte Esterase   


 


Urine RBC (Auto)   


 


Calcium Oxalate Crystal   


 


Hyaline Casts   


 


Influenza Typ A,B (EIA)  Negative for flu a/b  














  10/30/17 10/30/17 10/30/17





  12:59 12:59 14:42


 


WBC   


 


RBC   


 


Hgb   


 


Hct   


 


MCV   


 


MCH   


 


MCHC   


 


RDW   


 


Plt Count   


 


MPV   


 


Neut % (Auto)   


 


Lymph % (Auto)   


 


Mono % (Auto)   


 


Eos % (Auto)   


 


Baso % (Auto)   


 


Neut #   


 


Lymph #   


 


Mono #   


 


Eos #   


 


Baso #   


 


PT  13.8 H  


 


INR  1.2  


 


APTT  38 H  


 


pO2   


 


VBG pH   


 


VBG pCO2   


 


VBG HCO3   


 


VBG Total CO2   


 


VBG O2 Sat (Calc)   


 


VBG Base Excess   


 


VBG Potassium   


 


Sodium   131 L 


 


Chloride   96 L 


 


Glucose   


 


Lactate   


 


Potassium   3.4 L 


 


Carbon Dioxide   23 


 


Anion Gap   16 


 


BUN   17 


 


Creatinine   1.1 


 


Est GFR ( Amer)   > 60 


 


Est GFR (Non-Af Amer)   > 60 


 


Random Glucose   111 H 


 


Calcium   9.2 


 


Phosphorus   1.4 L 


 


Magnesium   1.8 


 


Total Bilirubin   0.8 


 


AST   33 


 


ALT   34 


 


Alkaline Phosphatase   82 


 


Troponin I   < 0.0120 


 


NT-Pro-B Natriuret Pep   249 


 


Total Protein   8.2 


 


Albumin   4.3 


 


Globulin   3.8 


 


Albumin/Globulin Ratio   1.1 


 


Venous Blood Potassium   


 


Urine Color    Straw


 


Urine Clarity    Clear


 


Urine pH    5.0


 


Ur Specific Gravity    1.005


 


Urine Protein    Negative


 


Urine Glucose (UA)    Normal


 


Urine Ketones    Negative


 


Urine Blood    1+ H


 


Urine Nitrate    Negative


 


Urine Bilirubin    Negative


 


Urine Urobilinogen    Normal


 


Ur Leukocyte Esterase    Neg


 


Urine RBC (Auto)    1


 


Calcium Oxalate Crystal    Occ H


 


Hyaline Casts    0-2


 


Influenza Typ A,B (EIA)   














  10/30/17





  17:20


 


WBC 


 


RBC 


 


Hgb 


 


Hct 


 


MCV 


 


MCH 


 


MCHC 


 


RDW 


 


Plt Count 


 


MPV 


 


Neut % (Auto) 


 


Lymph % (Auto) 


 


Mono % (Auto) 


 


Eos % (Auto) 


 


Baso % (Auto) 


 


Neut # 


 


Lymph # 


 


Mono # 


 


Eos # 


 


Baso # 


 


PT 


 


INR 


 


APTT 


 


pO2  31


 


VBG pH  7.36


 


VBG pCO2  43


 


VBG HCO3  22.8


 


VBG Total CO2  25.6


 


VBG O2 Sat (Calc)  67.3 H


 


VBG Base Excess  -1.3 L


 


VBG Potassium  3.3 L


 


Sodium  138.0


 


Chloride  106.0


 


Glucose  112 H


 


Lactate  1.5


 


Potassium 


 


Carbon Dioxide 


 


Anion Gap 


 


BUN 


 


Creatinine 


 


Est GFR ( Amer) 


 


Est GFR (Non-Af Amer) 


 


Random Glucose 


 


Calcium 


 


Phosphorus 


 


Magnesium 


 


Total Bilirubin 


 


AST 


 


ALT 


 


Alkaline Phosphatase 


 


Troponin I 


 


NT-Pro-B Natriuret Pep 


 


Total Protein 


 


Albumin 


 


Globulin 


 


Albumin/Globulin Ratio 


 


Venous Blood Potassium  3.3 L


 


Urine Color 


 


Urine Clarity 


 


Urine pH 


 


Ur Specific Gravity 


 


Urine Protein 


 


Urine Glucose (UA) 


 


Urine Ketones 


 


Urine Blood 


 


Urine Nitrate 


 


Urine Bilirubin 


 


Urine Urobilinogen 


 


Ur Leukocyte Esterase 


 


Urine RBC (Auto) 


 


Calcium Oxalate Crystal 


 


Hyaline Casts 


 


Influenza Typ A,B (EIA) 














Assessment & Plan


(1) Hemoptysis


Status: Acute   





(2) Pneumonia


Status: Acute   





- Assessment and Plan (Free Text)


Assessment: 





r/o TB   consult Dr Mathew

## 2017-11-01 LAB
ALBUMIN/GLOB SERPL: 1.1 {RATIO} (ref 1–2.1)
ALP SERPL-CCNC: 83 U/L (ref 38–126)
ALT SERPL-CCNC: 57 U/L (ref 21–72)
AST SERPL-CCNC: 50 U/L (ref 17–59)
BASOPHILS # BLD AUTO: 0.1 K/UL (ref 0–0.2)
BASOPHILS NFR BLD: 0.5 % (ref 0–2)
BASOPHILS NFR BLD: 1 % (ref 0–2)
BILIRUB SERPL-MCNC: 1.1 MG/DL (ref 0.2–1.3)
BUN SERPL-MCNC: 15 MG/DL (ref 9–20)
CALCIUM SERPL-MCNC: 8.2 MG/DL (ref 8.6–10.4)
CHLORIDE SERPL-SCNC: 100 MMOL/L (ref 98–107)
CO2 SERPL-SCNC: 22 MMOL/L (ref 22–30)
EOSINOPHIL # BLD AUTO: 0 K/UL (ref 0–0.7)
EOSINOPHIL NFR BLD: 0 % (ref 0–4)
ERYTHROCYTE [DISTWIDTH] IN BLOOD BY AUTOMATED COUNT: 13 % (ref 11.5–14.5)
GLOBULIN SER-MCNC: 3.3 GM/DL (ref 2.2–3.9)
GLUCOSE SERPL-MCNC: 144 MG/DL (ref 75–110)
H INFLUENZAE B: (no result)
HCT VFR BLD CALC: 36 % (ref 35–51)
LYMPHOCYTES # BLD AUTO: 0.8 K/UL (ref 1–4.3)
LYMPHOCYTES NFR BLD AUTO: 6.3 % (ref 20–40)
MCH RBC QN AUTO: 28.7 PG (ref 27–31)
MCHC RBC AUTO-ENTMCNC: 33 G/DL (ref 33–37)
MCV RBC AUTO: 87 FL (ref 80–94)
MONOCYTES # BLD: 1 K/UL (ref 0–0.8)
MONOCYTES NFR BLD: 8.6 % (ref 0–10)
N MEN SG B+E COLI K1 AG SPEC QL: (no result)
N MEN SG W135 AG SPEC QL: (no result)
NEUTROPHILS NFR BLD AUTO: 88 % (ref 50–75)
NRBC BLD AUTO-RTO: 0 % (ref 0–2)
PLATELET # BLD: 235 K/UL (ref 130–400)
PMV BLD AUTO: 7.9 FL (ref 7.2–11.7)
POTASSIUM SERPL-SCNC: 4 MMOL/L (ref 3.6–5.2)
PROT SERPL-MCNC: 6.9 G/DL (ref 6.3–8.3)
SODIUM SERPL-SCNC: 133 MMOL/L (ref 132–148)
TOTAL CELLS COUNTED BLD: 100
WBC # BLD AUTO: 12.1 K/UL (ref 4.8–10.8)

## 2017-11-01 RX ADMIN — ALBUTEROL SULFATE PRN MG: 2.5 SOLUTION RESPIRATORY (INHALATION) at 07:50

## 2017-11-01 RX ADMIN — TAZOBACTAM SODIUM AND PIPERACILLIN SODIUM SCH MLS/HR: 375; 3 INJECTION, SOLUTION INTRAVENOUS at 20:23

## 2017-11-01 RX ADMIN — TAZOBACTAM SODIUM AND PIPERACILLIN SODIUM SCH MLS/HR: 375; 3 INJECTION, SOLUTION INTRAVENOUS at 05:18

## 2017-11-01 RX ADMIN — TAZOBACTAM SODIUM AND PIPERACILLIN SODIUM SCH MLS/HR: 375; 3 INJECTION, SOLUTION INTRAVENOUS at 12:08

## 2017-11-01 NOTE — CP.PCM.PN
Subjective





- Date & Time of Evaluation


Date of Evaluation: 11/01/17


Time of Evaluation: 20:57





- Subjective


Subjective: 





Patient was seen and examined at bedside.  PPD was evaluated on the right arm 

which showed about a 7mm induration.  Follow up with quantiferon to confirm 

screening. 





Objective





- Vital Signs/Intake and Output


Vital Signs (last 24 hours): 


 











Temp Pulse Resp BP Pulse Ox


 


 100.2 F H  105 H  20   135/75   96 


 


 11/01/17 18:47  11/01/17 15:34  11/01/17 15:34  11/01/17 15:34  11/01/17 15:34











- Medications


Medications: 


 Current Medications





Acetaminophen (Tylenol 325mg Tab)  650 mg PO Q4 PRN


   PRN Reason: Pain, Mild (1-3)


   Last Admin: 11/01/17 16:08 Dose:  650 mg


Albuterol Sulfate (Albuterol 0.083% Inhal Sol (2.5 Mg/3 Ml) Ud)  2.5 mg INH RQ6 

PRN


   PRN Reason: Shortness of Breath


   Last Admin: 11/01/17 07:50 Dose:  2.5 mg


Ethambutol HCl (Myambutol)  800 mg PO DAILY [facility]


   Last Admin: 11/01/17 09:58 Dose:  800 mg


Piperacillin Sod/Tazobactam Sod (Zosyn 3.375 Gm Iv Premix)  3.375 gm in 50 mls 

@ 100 mls/hr IVPB Q8H [facility]


   Last Admin: 11/01/17 20:23 Dose:  100 mls/hr


Isoniazid (Niazid)  300 mg PO DAILY [facility]


   Last Admin: 11/01/17 09:58 Dose:  300 mg


Losartan Potassium (Cozaar)  50 mg PO DAILY [facility]


   Last Admin: 11/01/17 09:58 Dose:  50 mg


Promethazine HCl (Phenergan Syrup)  12.5 mg PO Q6 PRN


   PRN Reason: Cough


Pyrazinamide (Pyrazinamide)  1,000 mg PO DAILY [facility]


   Last Admin: 11/01/17 09:58 Dose:  1,000 mg


Pyridoxine HCl (Vitamin B6 50 Mg Tab)  50 mg PO DAILY [facility]


   Last Admin: 11/01/17 09:58 Dose:  50 mg


Rifampin (Rifampin Cap)  600 mg PO DAILY [facility]


   Last Admin: 11/01/17 10:16 Dose:  600 mg











- Labs


Labs: 


 





 11/01/17 08:35 





 11/01/17 07:13 





 











PT  13.8 SECONDS (9.7-12.2)  H  10/30/17  12:59    


 


INR  1.2   10/30/17  12:59    


 


APTT  38 SECONDS (21-34)  H  10/30/17  12:59

## 2017-11-01 NOTE — CP.PCM.PN
Subjective





- Date & Time of Evaluation


Date of Evaluation: 11/01/17


Time of Evaluation: 07:00





- Subjective


Subjective: 





STARTED TB MEDS


RX TO CONT





Objective





- Vital Signs/Intake and Output


Vital Signs (last 24 hours): 


 











Temp Pulse Resp BP Pulse Ox


 


 102.5 F H  105 H  20   135/75   96 


 


 11/01/17 16:08  11/01/17 15:34  11/01/17 15:34  11/01/17 15:34  11/01/17 15:34








Intake and Output: 


 











 11/01/17 11/01/17





 06:59 18:59


 


Intake Total 360 


 


Output Total 400 


 


Balance -40 














- Medications


Medications: 


 Current Medications





Acetaminophen (Tylenol 325mg Tab)  650 mg PO Q4 PRN


   PRN Reason: Pain, Mild (1-3)


   Last Admin: 11/01/17 16:08 Dose:  650 mg


Albuterol Sulfate (Albuterol 0.083% Inhal Sol (2.5 Mg/3 Ml) Ud)  2.5 mg INH RQ6 

PRN


   PRN Reason: Shortness of Breath


   Last Admin: 11/01/17 07:50 Dose:  2.5 mg


Ethambutol HCl (Myambutol)  800 mg PO DAILY [facility]


   Last Admin: 11/01/17 09:58 Dose:  800 mg


Piperacillin Sod/Tazobactam Sod (Zosyn 3.375 Gm Iv Premix)  3.375 gm in 50 mls 

@ 100 mls/hr IVPB Q8H [facility]


   Last Admin: 11/01/17 12:08 Dose:  100 mls/hr


Isoniazid (Niazid)  300 mg PO DAILY [facility]


   Last Admin: 11/01/17 09:58 Dose:  300 mg


Losartan Potassium (Cozaar)  50 mg PO DAILY [facility]


   Last Admin: 11/01/17 09:58 Dose:  50 mg


Promethazine HCl (Phenergan Syrup)  12.5 mg PO Q6 PRN


   PRN Reason: Cough


Pyrazinamide (Pyrazinamide)  1,000 mg PO DAILY [facility]


   Last Admin: 11/01/17 09:58 Dose:  1,000 mg


Pyridoxine HCl (Vitamin B6 50 Mg Tab)  50 mg PO DAILY [facility]


   Last Admin: 11/01/17 09:58 Dose:  50 mg


Rifampin (Rifampin Cap)  600 mg PO DAILY [facility]


   Last Admin: 11/01/17 10:16 Dose:  600 mg











- Labs


Labs: 


 





 11/01/17 08:35 





 11/01/17 07:13 





 











PT  13.8 SECONDS (9.7-12.2)  H  10/30/17  12:59    


 


INR  1.2   10/30/17  12:59    


 


APTT  38 SECONDS (21-34)  H  10/30/17  12:59    














- Constitutional


Appears: Non-toxic, Chronically Ill





- Head Exam


Head Exam: NORMOCEPHALIC





- Eye Exam


Eye Exam: PERRL





- ENT Exam


ENT Exam: Mucous Membranes Dry





- Neck Exam


Neck Exam: absent: Lymphadenopathy





- Respiratory Exam


Respiratory Exam: Decreased Breath Sounds





- Cardiovascular Exam


Cardiovascular Exam: REGULAR RHYTHM





- GI/Abdominal Exam


GI & Abdominal Exam: Distended





Assessment and Plan


(1) Hemoptysis


Status: Acute   





(2) Pneumonia


Status: Acute

## 2017-11-01 NOTE — CARD
--------------- APPROVED REPORT --------------





EKG Measurement

Heart [id number]

VT [id number]

[id number]

[id number]

[id number]



<Conclusion>

Normal sinus rhythm

Right bundle branch block

Abnormal ECG

## 2017-11-01 NOTE — CP.PCM.PN
<ShannonSupriya - Last Filed: 11/01/17 13:00>





Subjective





- Date & Time of Evaluation


Date of Evaluation: 11/01/17


Time of Evaluation: 09:00





- Subjective


Subjective: 





Pulmonology Note for Dr. Mathew's Service 





Patient seen and examined at bedside this morning. Febrile last night, tmax 

103.1, admitted to productive cough with blood tinged sputum. Denied chills, 

headache, chest pain, SOB, cough abdominal pain, n/v/d/c, or urinary symptoms.





Objective





- Vital Signs/Intake and Output


Vital Signs (last 24 hours): 


 











Temp Pulse Resp BP Pulse Ox


 


 103 F H  86   20   123/71   97 


 


 11/01/17 08:19  11/01/17 08:00  11/01/17 08:00  11/01/17 08:00  11/01/17 08:00








Intake and Output: 


 











 11/01/17 11/01/17





 06:59 18:59


 


Intake Total 360 


 


Output Total 400 


 


Balance -40 














- Medications


Medications: 


 Current Medications





Acetaminophen (Tylenol 325mg Tab)  650 mg PO Q4 PRN


   PRN Reason: Pain, Mild (1-3)


Albuterol Sulfate (Albuterol 0.083% Inhal Sol (2.5 Mg/3 Ml) Ud)  2.5 mg INH RQ6 

PRN


   PRN Reason: Shortness of Breath


   Last Admin: 11/01/17 07:50 Dose:  2.5 mg


Ethambutol HCl (Myambutol)  800 mg PO DAILY [facility], [facility]1983


   Last Admin: 11/01/17 09:58 Dose:  800 mg


Piperacillin Sod/Tazobactam Sod (Zosyn 3.375 Gm Iv Premix)  3.375 gm in 50 mls 

@ 100 mls/hr IVPB Q8H [facility], [facility]1983


   Last Admin: 11/01/17 05:18 Dose:  100 mls/hr


Isoniazid (Niazid)  300 mg PO DAILY [facility], [facility]


   Last Admin: 11/01/17 09:58 Dose:  300 mg


Losartan Potassium (Cozaar)  50 mg PO DAILY [facility], [facility]


   Last Admin: 11/01/17 09:58 Dose:  50 mg


Pyrazinamide (Pyrazinamide)  1,000 mg PO DAILY [facility]


   Last Admin: 11/01/17 09:58 Dose:  1,000 mg


Pyridoxine HCl (Vitamin B6 50 Mg Tab)  50 mg PO DAILY [facility]


   Last Admin: 11/01/17 09:58 Dose:  50 mg


Rifampin (Rifampin Cap)  600 mg PO DAILY [facility]


   Last Admin: 11/01/17 10:16 Dose:  600 mg











- Labs


Labs: 


 





 11/01/17 08:35 





 11/01/17 07:13 





 











PT  13.8 SECONDS (9.7-12.2)  H  10/30/17  12:59    


 


INR  1.2   10/30/17  12:59    


 


APTT  38 SECONDS (21-34)  H  10/30/17  12:59    














- Additional Findings


Additional findings: 





- Head Exam


Head Exam: NORMAL INSPECTION, NORMOCEPHALIC





- Eye Exam


Eye Exam: EOMI, Normal appearance, PERRL


Pupil Exam: NORMAL ACCOMODATION





- ENT Exam


ENT Exam: Mucous Membranes Dry





- Neck Exam


Neck exam: Positive for: Normal Inspection.  Negative for: Lymphadenopathy, 

Thyromegaly





- Respiratory Exam


Respiratory Exam: Prolonged Expiratory Phase, Wheezes





- Cardiovascular Exam


Cardiovascular Exam: Tachycardia





- GI/Abdominal Exam


GI & Abdominal Exam: Normal Bowel Sounds, Soft.  absent: Distended, Tenderness





- Extremities Exam


Extremities exam: Positive for: normal inspection, pedal pulses present.  

Negative for: pedal edema, tenderness





- Back Exam


Back exam: NORMAL INSPECTION





- Neurological Exam


Neurological exam: Alert, CN II-XII Intact, Oriented x3





- Psychiatric Exam


Psychiatric exam: Normal Affect, Normal Mood





- Skin


Skin Exam: Dry, Intact, Normal Color, Warm





Assessment and Plan





- Assessment and Plan (Free Text)


Plan: 





Suspected Tuberculosis


Continue current management - will continue to monitor  





Imaging:


 CXR 10/30: Rostral patchy infiltrate left apex versus bony overlap or even 

bony abnormality at the left for 2nd and 3rd ribs. Consider follow-up two-view 

chest radiography or CT. No potential acute right pulmonary findings.





 CT Chest 10/30: Primarily upper lobe masses and infiltrates including a 

dominant cavitary mass in the right upper lobe. Strong consideration to 

granulomatous disease/ reactivation tuberculosis. Less likely considerations 

have been provided above.Cholelithiasis without CT evidence of acute 

cholecystitis. 





Medications:


 Zosyn by primary started 10/31


 Ethambutol, Isoniazid, Rifampin, Pyridoxine, Pyrazinamide - started 10/31 by 

primary and ID - Dr. Ross 





Results:


 Febrile - Tmax 103.1, continuted  Leukocytosis, with left shift 


 HIV NEGATIVE, rapid flu - negative, legionella- negative, HIV- negative, 

Mycoplasma Igm - negative, strep pneumo - negative


 Procalcitonin - 0.56


 F/U quantiferon, AFBs





Hx HTN





DW Shannon Da Silva DO, PGY-1








<Rufus Mathew S - Last Filed: 11/01/17 16:38>





Subjective





- Date & Time of Evaluation


Time of Evaluation: 12:15





Objective





- Vital Signs/Intake and Output


Vital Signs (last 24 hours): 


 











Temp Pulse Resp BP Pulse Ox


 


 102.5 F H  105 H  20   135/75   96 


 


 11/01/17 16:08  11/01/17 15:34  11/01/17 15:34  11/01/17 15:34  11/01/17 15:34








Intake and Output: 


 











 11/01/17 11/01/17





 06:59 18:59


 


Intake Total 360 


 


Output Total 400 


 


Balance -40 














- Medications


Medications: 


 Current Medications





Acetaminophen (Tylenol 325mg Tab)  650 mg PO Q4 PRN


   PRN Reason: Pain, Mild (1-3)


   Last Admin: 11/01/17 16:08 Dose:  650 mg


Albuterol Sulfate (Albuterol 0.083% Inhal Sol (2.5 Mg/3 Ml) Ud)  2.5 mg INH RQ6 

PRN


   PRN Reason: Shortness of Breath


   Last Admin: 11/01/17 07:50 Dose:  2.5 mg


Ethambutol HCl (Myambutol)  800 mg PO DAILY [facility], [facility]


   Last Admin: 11/01/17 09:58 Dose:  800 mg


Piperacillin Sod/Tazobactam Sod (Zosyn 3.375 Gm Iv Premix)  3.375 gm in 50 mls 

@ 100 mls/hr IVPB Q8H [facility]


   Last Admin: 11/01/17 12:08 Dose:  100 mls/hr


Isoniazid (Niazid)  300 mg PO DAILY [facility]


   Last Admin: 11/01/17 09:58 Dose:  300 mg


Losartan Potassium (Cozaar)  50 mg PO DAILY [facility]


   Last Admin: 11/01/17 09:58 Dose:  50 mg


Promethazine HCl (Phenergan Syrup)  12.5 mg PO Q6 PRN


   PRN Reason: Cough


Pyrazinamide (Pyrazinamide)  1,000 mg PO DAILY [facility]


   Last Admin: 11/01/17 09:58 Dose:  1,000 mg


Pyridoxine HCl (Vitamin B6 50 Mg Tab)  50 mg PO DAILY [facility]


   Last Admin: 11/01/17 09:58 Dose:  50 mg


Rifampin (Rifampin Cap)  600 mg PO DAILY [facility]


   Last Admin: 11/01/17 10:16 Dose:  600 mg











- Labs


Labs: 


 





 11/01/17 08:35 





 11/01/17 07:13 





 











PT  13.8 SECONDS (9.7-12.2)  H  10/30/17  12:59    


 


INR  1.2   10/30/17  12:59    


 


APTT  38 SECONDS (21-34)  H  10/30/17  12:59    














Attending/Attestation





- Attestation


I have personally seen and examined this patient.: Yes


I have fully participated in the care of the patient.: Yes


I have reviewed all pertinent clinical information, including history, physical 

exam and plan: Yes


Notes (Text): 





11/01/17 16:36


Patient seen and examined.


Continue anti-TB medication


Continue isolation


Follow-up sputum AFB

## 2017-11-01 NOTE — CP.PCM.PN
Subjective





- Date & Time of Evaluation


Date of Evaluation: 11/01/17


Time of Evaluation: 08:37





- Subjective


Subjective: 





Pt no complain; cough feels much loser and less bloody.


(+) is whitish, bld tinge only. Appetite is fair, nauseous at times.


No CP, no SOB, (+) fever inc to 103 but no chills. no diarrhea, no vomiting





Objective





- Vital Signs/Intake and Output


Vital Signs (last 24 hours): 


 











Temp Pulse Resp BP Pulse Ox


 


 103 F H  91 H  20   101/68   97 


 


 11/01/17 08:19  10/31/17 23:30  10/31/17 23:30  10/31/17 23:30  10/31/17 23:30








Intake and Output: 


 











 11/01/17 11/01/17





 06:59 18:59


 


Intake Total 360 


 


Output Total 400 


 


Balance -40 














- Medications


Medications: 


 Current Medications





Acetaminophen (Tylenol 325mg Tab)  650 mg PO Q4 PRN


   PRN Reason: Pain, Mild (1-3)


Albuterol Sulfate (Albuterol 0.083% Inhal Sol (2.5 Mg/3 Ml) Ud)  2.5 mg INH RQ6 

PRN


   PRN Reason: Shortness of Breath


   Last Admin: 11/01/17 07:50 Dose:  2.5 mg


Ethambutol HCl (Myambutol)  800 mg PO DAILY [facility]


   Last Admin: 10/31/17 10:59 Dose:  800 mg


Piperacillin Sod/Tazobactam Sod (Zosyn 3.375 Gm Iv Premix)  3.375 gm in 50 mls 

@ 100 mls/hr IVPB Q8H [facility]


   Last Admin: 11/01/17 05:18 Dose:  100 mls/hr


Isoniazid (Niazid)  300 mg PO DAILY [facility]


   Last Admin: 10/31/17 10:59 Dose:  300 mg


Losartan Potassium (Cozaar)  50 mg PO DAILY [facility]


   Last Admin: 10/31/17 09:07 Dose:  50 mg


Pyrazinamide (Pyrazinamide)  1,000 mg PO DAILY [facility]


Pyridoxine HCl (Vitamin B6 50 Mg Tab)  50 mg PO DAILY [facility]


   Last Admin: 10/31/17 10:59 Dose:  50 mg


Rifampin (Rifampin Cap)  600 mg PO DAILY [facility]











- Labs


Labs: 


 





 11/01/17 08:35 





 11/01/17 07:13 





 











PT  13.8 SECONDS (9.7-12.2)  H  10/30/17  12:59    


 


INR  1.2   10/30/17  12:59    


 


APTT  38 SECONDS (21-34)  H  10/30/17  12:59    














- Constitutional


Appears: No Acute Distress





- Eye Exam


Eye Exam: Normal appearance





- ENT Exam


ENT Exam: Mucous Membranes Moist





- Neck Exam


Neck Exam: Full ROM





- Respiratory Exam


Respiratory Exam: Decreased Breath Sounds, Rhonchi, Wheezes.  absent: Rales





- Cardiovascular Exam


Cardiovascular Exam: REGULAR RHYTHM, +S1, +S2.  absent: Gallop, JVD, Murmur





- GI/Abdominal Exam


GI & Abdominal Exam: Soft.  absent: Tenderness, Rebound





- Extremities Exam


Extremities Exam: Normal Capillary Refill.  absent: Calf Tenderness, Joint 

Swelling, Pedal Edema





- Skin


Skin Exam: Normal Color, Warm.  absent: Pallor, Rash, Urticaria





Assessment and Plan





- Assessment and Plan (Free Text)


Assessment: 





Fever/ abn CXR


HTN





Discuss to inc calorie intake


Cont meds

## 2017-11-02 RX ADMIN — TAZOBACTAM SODIUM AND PIPERACILLIN SODIUM SCH MLS/HR: 375; 3 INJECTION, SOLUTION INTRAVENOUS at 06:13

## 2017-11-02 RX ADMIN — TAZOBACTAM SODIUM AND PIPERACILLIN SODIUM SCH MLS/HR: 375; 3 INJECTION, SOLUTION INTRAVENOUS at 21:19

## 2017-11-02 RX ADMIN — TAZOBACTAM SODIUM AND PIPERACILLIN SODIUM SCH MLS/HR: 375; 3 INJECTION, SOLUTION INTRAVENOUS at 12:59

## 2017-11-02 NOTE — CP.PCM.PN
Subjective





- Date & Time of Evaluation


Date of Evaluation: 11/02/17


Time of Evaluation: 08:00





- Subjective


Subjective: 





AFB +





rx in progress 





Objective





- Vital Signs/Intake and Output


Vital Signs (last 24 hours): 


 











Temp Pulse Resp BP Pulse Ox


 


 99.3 F   85   20   112/75   95 


 


 11/02/17 16:00  11/02/17 16:00  11/02/17 16:00  11/02/17 16:00  11/02/17 16:00








Intake and Output: 


 











 11/02/17 11/03/17





 18:59 06:59


 


Intake Total 530 


 


Balance 530 














- Medications


Medications: 


 Current Medications





Acetaminophen (Tylenol 325mg Tab)  650 mg PO Q4 PRN


   PRN Reason: Pain, Mild (1-3)


   Last Admin: 11/02/17 14:01 Dose:  650 mg


Albuterol Sulfate (Albuterol 0.083% Inhal Sol (2.5 Mg/3 Ml) Ud)  2.5 mg INH RQ6 

PRN


   PRN Reason: Shortness of Breath


   Last Admin: 11/01/17 07:50 Dose:  2.5 mg


Ethambutol HCl (Myambutol)  800 mg PO DAILY [facility]


   Last Admin: 11/02/17 10:17 Dose:  800 mg


Piperacillin Sod/Tazobactam Sod (Zosyn 3.375 Gm Iv Premix)  3.375 gm in 50 mls 

@ 100 mls/hr IVPB Q8H [facility]


   Last Admin: 11/02/17 12:59 Dose:  100 mls/hr


Isoniazid (Niazid)  300 mg PO DAILY [facility]


   Last Admin: 11/02/17 10:17 Dose:  300 mg


Losartan Potassium (Cozaar)  50 mg PO DAILY [facility]


   Last Admin: 11/02/17 10:17 Dose:  50 mg


Promethazine HCl (Phenergan Syrup)  12.5 mg PO Q6 PRN


   PRN Reason: Cough


Pyrazinamide (Pyrazinamide)  1,000 mg PO DAILY [facility]


   Last Admin: 11/02/17 10:17 Dose:  1,000 mg


Pyridoxine HCl (Vitamin B6 50 Mg Tab)  50 mg PO DAILY [facility]


   Last Admin: 11/02/17 10:17 Dose:  50 mg


Rifampin (Rifampin Cap)  600 mg PO DAILY [facility]


   Last Admin: 11/02/17 10:17 Dose:  600 mg











- Labs


Labs: 


 





 11/01/17 08:35 





 11/01/17 07:13 





 











PT  13.8 SECONDS (9.7-12.2)  H  10/30/17  12:59    


 


INR  1.2   10/30/17  12:59    


 


APTT  38 SECONDS (21-34)  H  10/30/17  12:59    














- Constitutional


Appears: Non-toxic, Chronically Ill





- Head Exam


Head Exam: NORMOCEPHALIC





- Eye Exam


Eye Exam: PERRL





- ENT Exam


ENT Exam: Mucous Membranes Dry





- Neck Exam


Neck Exam: absent: Lymphadenopathy





- Respiratory Exam


Respiratory Exam: Decreased Breath Sounds





- Cardiovascular Exam


Cardiovascular Exam: REGULAR RHYTHM





- GI/Abdominal Exam


GI & Abdominal Exam: Distended





Assessment and Plan


(1) Hemoptysis


Status: Acute   





(2) Pneumonia


Status: Acute

## 2017-11-02 NOTE — CP.PCM.PN
Subjective





- Date & Time of Evaluation


Date of Evaluation: 11/02/17


Time of Evaluation: 08:40





- Subjective


Subjective: 





Pt no complain; appetite is getting da. Feel nash "decrease in cough and 

breathing is normal".


No CP, no SOB, no edema, no diarrhea, no n/v





Objective





- Vital Signs/Intake and Output


Vital Signs (last 24 hours): 


 











Temp Pulse Resp BP Pulse Ox


 


 97.7 F   82   20   132/86   98 


 


 11/02/17 08:00  11/02/17 08:00  11/02/17 08:00  11/02/17 08:00  11/02/17 08:00








Intake and Output: 


 











 11/02/17 11/02/17





 06:59 18:59


 


Intake Total 550 


 


Output Total 400 


 


Balance 150 














- Medications


Medications: 


 Current Medications





Acetaminophen (Tylenol 325mg Tab)  650 mg PO Q4 PRN


   PRN Reason: Pain, Mild (1-3)


   Last Admin: 11/01/17 22:53 Dose:  650 mg


Albuterol Sulfate (Albuterol 0.083% Inhal Sol (2.5 Mg/3 Ml) Ud)  2.5 mg INH RQ6 

PRN


   PRN Reason: Shortness of Breath


   Last Admin: 11/01/17 07:50 Dose:  2.5 mg


Ethambutol HCl (Myambutol)  800 mg PO DAILY [facility]


   Last Admin: 11/01/17 09:58 Dose:  800 mg


Piperacillin Sod/Tazobactam Sod (Zosyn 3.375 Gm Iv Premix)  3.375 gm in 50 mls 

@ 100 mls/hr IVPB Q8H [facility]


   Last Admin: 11/02/17 06:13 Dose:  100 mls/hr


Isoniazid (Niazid)  300 mg PO DAILY [facility]


   Last Admin: 11/01/17 09:58 Dose:  300 mg


Losartan Potassium (Cozaar)  50 mg PO DAILY [facility]


   Last Admin: 11/01/17 09:58 Dose:  50 mg


Promethazine HCl (Phenergan Syrup)  12.5 mg PO Q6 PRN


   PRN Reason: Cough


Pyrazinamide (Pyrazinamide)  1,000 mg PO DAILY [facility]


   Last Admin: 11/01/17 09:58 Dose:  1,000 mg


Pyridoxine HCl (Vitamin B6 50 Mg Tab)  50 mg PO DAILY [facility]


   Last Admin: 11/01/17 09:58 Dose:  50 mg


Rifampin (Rifampin Cap)  600 mg PO DAILY [facility]


   Last Admin: 11/01/17 10:16 Dose:  600 mg











- Labs


Labs: 


 





 11/01/17 08:35 





 11/01/17 07:13 





 











PT  13.8 SECONDS (9.7-12.2)  H  10/30/17  12:59    


 


INR  1.2   10/30/17  12:59    


 


APTT  38 SECONDS (21-34)  H  10/30/17  12:59    














- Constitutional


Appears: No Acute Distress





- Eye Exam


Eye Exam: Normal appearance





- ENT Exam


ENT Exam: Mucous Membranes Moist





- Neck Exam


Neck Exam: Full ROM.  absent: Lymphadenopathy, Normal Inspection





- Respiratory Exam


Respiratory Exam: Decreased Breath Sounds.  absent: Rales, Wheezes





- Cardiovascular Exam


Cardiovascular Exam: REGULAR RHYTHM, +S1, +S2.  absent: Gallop, JVD, Murmur





- GI/Abdominal Exam


GI & Abdominal Exam: Soft.  absent: Tenderness, Mass





- Extremities Exam


Extremities Exam: Full ROM.  absent: Calf Tenderness, Joint Swelling, Normal 

Capillary Refill, Pedal Edema





Assessment and Plan





- Assessment and Plan (Free Text)


Assessment: 





Pneumonia w/ PTB


HTN





Cont meds


Await Microbiology

## 2017-11-02 NOTE — CP.PCM.PN
<Jayde Ortegaa - Last Filed: 11/02/17 11:22>





Subjective





- Date & Time of Evaluation


Date of Evaluation: 11/02/17


Time of Evaluation: 09:00





- Subjective


Subjective: 





Pulmonology Note for Dr. Mathew's Service 





Patient seen and examined at bedside this morning. Febrile last night, tmax 

103.1, admitted to less of a productive cough with blood tinged sputum. Patient 

reports his breathing has improved. Denied chills, headache, chest pain, 

abdominal pain, n/v/d/c, or urinary symptoms.





Objective





- Vital Signs/Intake and Output


Vital Signs (last 24 hours): 


 











Temp Pulse Resp BP Pulse Ox


 


 97.7 F   82   20   132/86   98 


 


 11/02/17 08:00  11/02/17 08:00  11/02/17 08:00  11/02/17 08:00  11/02/17 08:00








Intake and Output: 


 











 11/02/17 11/02/17





 06:59 18:59


 


Intake Total 550 


 


Output Total 400 


 


Balance 150 














- Medications


Medications: 


 Current Medications





Acetaminophen (Tylenol 325mg Tab)  650 mg PO Q4 PRN


   PRN Reason: Pain, Mild (1-3)


   Last Admin: 11/01/17 22:53 Dose:  650 mg


Albuterol Sulfate (Albuterol 0.083% Inhal Sol (2.5 Mg/3 Ml) Ud)  2.5 mg INH RQ6 

PRN


   PRN Reason: Shortness of Breath


   Last Admin: 11/01/17 07:50 Dose:  2.5 mg


Ethambutol HCl (Myambutol)  800 mg PO DAILY [facility]


   Last Admin: 11/01/17 09:58 Dose:  800 mg


Piperacillin Sod/Tazobactam Sod (Zosyn 3.375 Gm Iv Premix)  3.375 gm in 50 mls 

@ 100 mls/hr IVPB Q8H [facility]


   Last Admin: 11/02/17 06:13 Dose:  100 mls/hr


Isoniazid (Niazid)  300 mg PO DAILY [facility]


   Last Admin: 11/01/17 09:58 Dose:  300 mg


Losartan Potassium (Cozaar)  50 mg PO DAILY [facility]


   Last Admin: 11/01/17 09:58 Dose:  50 mg


Promethazine HCl (Phenergan Syrup)  12.5 mg PO Q6 PRN


   PRN Reason: Cough


Pyrazinamide (Pyrazinamide)  1,000 mg PO DAILY [facility]


   Last Admin: 11/01/17 09:58 Dose:  1,000 mg


Pyridoxine HCl (Vitamin B6 50 Mg Tab)  50 mg PO DAILY [facility]


   Last Admin: 11/01/17 09:58 Dose:  50 mg


Rifampin (Rifampin Cap)  600 mg PO DAILY [facility]


   Last Admin: 11/01/17 10:16 Dose:  600 mg











- Labs


Labs: 


 





 11/01/17 08:35 





 11/01/17 07:13 





 











PT  13.8 SECONDS (9.7-12.2)  H  10/30/17  12:59    


 


INR  1.2   10/30/17  12:59    


 


APTT  38 SECONDS (21-34)  H  10/30/17  12:59    














- Additional Findings


Additional findings: 





- Head Exam


Head Exam: NORMAL INSPECTION, NORMOCEPHALIC





- Eye Exam


Eye Exam: EOMI, Normal appearance, PERRL


Pupil Exam: NORMAL ACCOMODATION





- ENT Exam


ENT Exam: Mucous Membranes Dry





- Neck Exam


Neck exam: Positive for: Normal Inspection.  Negative for: Lymphadenopathy, 

Thyromegaly





- Respiratory Exam


Respiratory Exam: Prolonged Expiratory Phase, Wheezes





- Cardiovascular Exam


Cardiovascular Exam: Tachycardia





- GI/Abdominal Exam


GI & Abdominal Exam: Normal Bowel Sounds, Soft.  absent: Distended, Tenderness





- Extremities Exam


Extremities exam: Positive for: normal inspection, pedal pulses present.  

Negative for: pedal edema, tenderness





- Back Exam


Back exam: NORMAL INSPECTION





- Neurological Exam


Neurological exam: Alert, CN II-XII Intact, Oriented x3





- Psychiatric Exam


Psychiatric exam: Normal Affect, Normal Mood





- Skin


Skin Exam: Dry, Intact, Normal Color, Warm








Assessment and Plan





- Assessment and Plan (Free Text)


Plan: 





Confirmed Tuberculosis


Isolation Precautions


Continue current management - will continue to monitor  





Imaging:


 CXR 10/30: Rostral patchy infiltrate left apex versus bony overlap or even 

bony abnormality at the left for 2nd and 3rd ribs. Consider follow-up two-view 

chest radiography or CT. No potential acute right pulmonary findings.





 CT Chest 10/30: Primarily upper lobe masses and infiltrates including a 

dominant cavitary mass in the right upper lobe. Strong consideration to 

granulomatous disease/ reactivation tuberculosis. Less likely considerations 

have been provided above.Cholelithiasis without CT evidence of acute 

cholecystitis. 





Medications:


 Zosyn by primary started 10/31


 Ethambutol, Isoniazid, Rifampin, Pyridoxine, Pyrazinamide - started 10/31 by 

primary and ID - Dr. Ross 





Results:


 Febrile - Tmax 103.1, continuted  Leukocytosis, with left shift 


 HIV NEGATIVE, rapid flu - negative, legionella- negative, HIV- negative, 

Mycoplasma Igm - negative, strep pneumo - negative


 Procalcitonin - 0.56


 AFBs - + TB 


 F/U quantiferon





Hx HTN





DW Shannon Da Silva DO, PGY-1








<Rufus Mathew S - Last Filed: 11/02/17 16:38>





Subjective





- Date & Time of Evaluation


Time of Evaluation: 11:10





Objective





- Vital Signs/Intake and Output


Vital Signs (last 24 hours): 


 











Temp Pulse Resp BP Pulse Ox


 


 102.8 F H  80   20   126/69   96 


 


 11/02/17 14:02  11/02/17 14:02  11/02/17 14:02  11/02/17 14:02  11/02/17 14:02








Intake and Output: 


 











 11/02/17 11/02/17





 06:59 18:59


 


Intake Total 550 


 


Output Total 400 


 


Balance 150 














- Medications


Medications: 


 Current Medications





Acetaminophen (Tylenol 325mg Tab)  650 mg PO Q4 PRN


   PRN Reason: Pain, Mild (1-3)


   Last Admin: 11/02/17 14:01 Dose:  650 mg


Albuterol Sulfate (Albuterol 0.083% Inhal Sol (2.5 Mg/3 Ml) Ud)  2.5 mg INH RQ6 

PRN


   PRN Reason: Shortness of Breath


   Last Admin: 11/01/17 07:50 Dose:  2.5 mg


Ethambutol HCl (Myambutol)  800 mg PO DAILY [facility]


   Last Admin: 11/02/17 10:17 Dose:  800 mg


Piperacillin Sod/Tazobactam Sod (Zosyn 3.375 Gm Iv Premix)  3.375 gm in 50 mls 

@ 100 mls/hr IVPB Q8H [facility]


   Last Admin: 11/02/17 12:59 Dose:  100 mls/hr


Isoniazid (Niazid)  300 mg PO DAILY [facility]


   Last Admin: 11/02/17 10:17 Dose:  300 mg


Losartan Potassium (Cozaar)  50 mg PO DAILY [facility]


   Last Admin: 11/02/17 10:17 Dose:  50 mg


Promethazine HCl (Phenergan Syrup)  12.5 mg PO Q6 PRN


   PRN Reason: Cough


Pyrazinamide (Pyrazinamide)  1,000 mg PO DAILY [facility]


   Last Admin: 11/02/17 10:17 Dose:  1,000 mg


Pyridoxine HCl (Vitamin B6 50 Mg Tab)  50 mg PO DAILY [facility]


   Last Admin: 11/02/17 10:17 Dose:  50 mg


Rifampin (Rifampin Cap)  600 mg PO DAILY [facility]


   Last Admin: 11/02/17 10:17 Dose:  600 mg











- Labs


Labs: 


 





 11/01/17 08:35 





 11/01/17 07:13 





 











PT  13.8 SECONDS (9.7-12.2)  H  10/30/17  12:59    


 


INR  1.2   10/30/17  12:59    


 


APTT  38 SECONDS (21-34)  H  10/30/17  12:59    














Attending/Attestation





- Attestation


I have personally seen and examined this patient.: Yes


I have fully participated in the care of the patient.: Yes


I have reviewed all pertinent clinical information, including history, physical 

exam and plan: Yes


Notes (Text): 





11/02/17 16:38


patient seen and examined.


Continue respiratory isolation


continue anti-TB meds


Followup culture and sensitivity

## 2017-11-03 RX ADMIN — TAZOBACTAM SODIUM AND PIPERACILLIN SODIUM SCH MLS/HR: 375; 3 INJECTION, SOLUTION INTRAVENOUS at 04:54

## 2017-11-03 NOTE — CP.PCM.PN
Subjective





- Date & Time of Evaluation


Date of Evaluation: 11/03/17


Time of Evaluation: 08:00





- Subjective


Subjective: 





ON RIPE RX 


Febrile last night, tmax 101.8, admitted to less of a productive cough, no 

chest pain, no SOB. 








Objective





- Vital Signs/Intake and Output


Vital Signs (last 24 hours): 


 











Temp Pulse Resp BP Pulse Ox


 


 98 F   67   20   126/78   97 


 


 11/03/17 15:22  11/03/17 15:22  11/03/17 15:22  11/03/17 15:22  11/03/17 15:22








Intake and Output: 


 











 11/03/17 11/03/17





 06:59 18:59


 


Intake Total  1000


 


Balance  1000














- Medications


Medications: 


 Current Medications





Acetaminophen (Tylenol 325mg Tab)  650 mg PO Q6H PRN


   PRN Reason: Fever >100.4 F


   Last Admin: 11/03/17 10:11 Dose:  650 mg


Ethambutol HCl (Myambutol)  800 mg PO DAILY [facility]


   Last Admin: 11/03/17 10:11 Dose:  800 mg


Isoniazid (Niazid)  300 mg PO DAILY [facility]


   Last Admin: 11/03/17 10:11 Dose:  300 mg


Losartan Potassium (Cozaar)  50 mg PO DAILY [facility]


   Last Admin: 11/03/17 10:11 Dose:  50 mg


Moxifloxacin HCl (Avelox)  400 mg PO DAILY [facility]


   Last Admin: 11/03/17 10:11 Dose:  400 mg


Pyrazinamide (Pyrazinamide)  1,000 mg PO DAILY [facility]


   Last Admin: 11/03/17 10:11 Dose:  1,000 mg


Pyridoxine HCl (Vitamin B6 50 Mg Tab)  50 mg PO DAILY [facility]


   Last Admin: 11/03/17 10:10 Dose:  50 mg


Rifampin (Rifampin Cap)  600 mg PO DAILY [facility]


   Last Admin: 11/03/17 10:10 Dose:  600 mg











- Labs


Labs: 


 





 11/01/17 08:35 





 11/01/17 07:13 





 











PT  13.8 SECONDS (9.7-12.2)  H  10/30/17  12:59    


 


INR  1.2   10/30/17  12:59    


 


APTT  38 SECONDS (21-34)  H  10/30/17  12:59    














- Constitutional


Appears: Non-toxic, Cachectic, Chronically Ill





- Head Exam


Head Exam: NORMOCEPHALIC





- Eye Exam


Eye Exam: PERRL.  absent: Scleral icterus





- ENT Exam


ENT Exam: Mucous Membranes Dry





- Neck Exam


Neck Exam: absent: Lymphadenopathy





- Respiratory Exam


Respiratory Exam: Decreased Breath Sounds, Rhonchi





- Cardiovascular Exam


Cardiovascular Exam: REGULAR RHYTHM, +S1, +S2





- GI/Abdominal Exam


GI & Abdominal Exam: Distended, Soft.  absent: Tenderness





- Rectal Exam


Rectal Exam: Deferred





-  Exam


 Exam: NORMAL INSPECTION





- Extremities Exam


Extremities Exam: absent: Calf Tenderness, Pedal Edema





- Back Exam


Back Exam: absent: CVA tenderness (L), CVA tenderness (R)





- Neurological Exam


Neurological Exam: Alert, Awake, Oriented x3





- Psychiatric Exam


Psychiatric exam: Depressed





- Skin


Skin Exam: Dry, Intact





Assessment and Plan


(1) Hemoptysis


Status: Acute   





(2) Pneumonia


Status: Acute   





(3) Tuberculosis


Status: Acute   





(4) Tuberculosis


Status: Acute

## 2017-11-03 NOTE — CP.PCM.PN
Subjective





- Date & Time of Evaluation


Date of Evaluation: 11/02/17


Time of Evaluation: 23:00





- Subjective


Subjective: 





Patient was seen and examined at bedside.  PPD was evaluated on the right arm 

which showed about a 5mm induration.  Follow up with quantiferon to confirm 

screening. 





Objective





- Vital Signs/Intake and Output


Vital Signs (last 24 hours): 


 











Temp Pulse Resp BP Pulse Ox


 


 101.8 F H  85   20   112/75   95 


 


 11/02/17 21:19  11/02/17 16:00  11/02/17 16:00  11/02/17 16:00  11/02/17 16:00








Intake and Output: 


 











 11/02/17 11/03/17





 18:59 06:59


 


Intake Total 530 


 


Balance 530 














- Medications


Medications: 


 Current Medications





Acetaminophen (Tylenol 325mg Tab)  650 mg PO Q4 PRN


   PRN Reason: Pain, Mild (1-3)


   Last Admin: 11/02/17 21:19 Dose:  650 mg


Albuterol Sulfate (Albuterol 0.083% Inhal Sol (2.5 Mg/3 Ml) Ud)  2.5 mg INH RQ6 

PRN


   PRN Reason: Shortness of Breath


   Last Admin: 11/01/17 07:50 Dose:  2.5 mg


Ethambutol HCl (Myambutol)  800 mg PO DAILY [facility]


   Last Admin: 11/02/17 10:17 Dose:  800 mg


Piperacillin Sod/Tazobactam Sod (Zosyn 3.375 Gm Iv Premix)  3.375 gm in 50 mls 

@ 100 mls/hr IVPB Q8H [facility]


   Last Admin: 11/02/17 21:19 Dose:  100 mls/hr


Isoniazid (Niazid)  300 mg PO DAILY [facility]


   Last Admin: 11/02/17 10:17 Dose:  300 mg


Losartan Potassium (Cozaar)  50 mg PO DAILY [facility]


   Last Admin: 11/02/17 10:17 Dose:  50 mg


Promethazine HCl (Phenergan Syrup)  12.5 mg PO Q6 PRN


   PRN Reason: Cough


Pyrazinamide (Pyrazinamide)  1,000 mg PO DAILY [facility]


   Last Admin: 11/02/17 10:17 Dose:  1,000 mg


Pyridoxine HCl (Vitamin B6 50 Mg Tab)  50 mg PO DAILY [facility]


   Last Admin: 11/02/17 10:17 Dose:  50 mg


Rifampin (Rifampin Cap)  600 mg PO DAILY [facility]


   Last Admin: 11/02/17 10:17 Dose:  600 mg











- Labs


Labs: 


 





 11/01/17 08:35 





 11/01/17 07:13 





 











PT  13.8 SECONDS (9.7-12.2)  H  10/30/17  12:59    


 


INR  1.2   10/30/17  12:59    


 


APTT  38 SECONDS (21-34)  H  10/30/17  12:59

## 2017-11-03 NOTE — CP.PCM.PN
Subjective





- Date & Time of Evaluation


Date of Evaluation: 11/03/17


Time of Evaluation: 09:00





- Subjective


Subjective: 





Pulmonology Note for Dr. Mathew's Service 





Patient seen and examined at bedside this morning. Febrile last night, tmax 

101.8, admitted to less of a productive cough, no chest pain, no SOB. Denied 

chills, headache, chest pain, abdominal pain, n/v/d/c, or urinary symptoms.





Objective





- Vital Signs/Intake and Output


Vital Signs (last 24 hours): 


 











Temp Pulse Resp BP Pulse Ox


 


 100.3 F H  117 H  21   119/76   95 


 


 11/03/17 08:39  11/03/17 08:39  11/03/17 08:39  11/03/17 08:39  11/03/17 08:39











- Medications


Medications: 


 Current Medications





Acetaminophen (Tylenol 325mg Tab)  650 mg PO Q6H PRN


   PRN Reason: Fever >100.4 F


Ethambutol HCl (Myambutol)  800 mg PO DAILY [facility]


   Last Admin: 11/02/17 10:17 Dose:  800 mg


Isoniazid (Niazid)  300 mg PO DAILY [facility]


   Last Admin: 11/02/17 10:17 Dose:  300 mg


Losartan Potassium (Cozaar)  50 mg PO DAILY [facility]


   Last Admin: 11/02/17 10:17 Dose:  50 mg


Moxifloxacin HCl (Avelox)  400 mg PO DAILY [facility]


Pyrazinamide (Pyrazinamide)  1,000 mg PO DAILY [facility]


   Last Admin: 11/02/17 10:17 Dose:  1,000 mg


Pyridoxine HCl (Vitamin B6 50 Mg Tab)  50 mg PO DAILY [facility]


   Last Admin: 11/02/17 10:17 Dose:  50 mg


Rifampin (Rifampin Cap)  600 mg PO DAILY [facility]


   Last Admin: 11/02/17 10:17 Dose:  600 mg











- Labs


Labs: 


 





 11/01/17 08:35 





 11/01/17 07:13 





 











PT  13.8 SECONDS (9.7-12.2)  H  10/30/17  12:59    


 


INR  1.2   10/30/17  12:59    


 


APTT  38 SECONDS (21-34)  H  10/30/17  12:59    














- Additional Findings


Additional findings: 





- Head Exam


Head Exam: NORMAL INSPECTION, NORMOCEPHALIC





- Eye Exam


Eye Exam: EOMI, Normal appearance, PERRL


Pupil Exam: NORMAL ACCOMODATION





- ENT Exam


ENT Exam: Mucous Membranes Dry





- Neck Exam


Neck exam: Positive for: Normal Inspection.  Negative for: Lymphadenopathy, 

Thyromegaly





- Respiratory Exam


Respiratory Exam: Prolonged Expiratory Phase, Wheezes





- Cardiovascular Exam


Cardiovascular Exam: Tachycardia





- GI/Abdominal Exam


GI & Abdominal Exam: Normal Bowel Sounds, Soft.  absent: Distended, Tenderness





- Extremities Exam


Extremities exam: Positive for: normal inspection, pedal pulses present.  

Negative for: pedal edema, tenderness





- Back Exam


Back exam: NORMAL INSPECTION





- Neurological Exam


Neurological exam: Alert, CN II-XII Intact, Oriented x3





- Psychiatric Exam


Psychiatric exam: Normal Affect, Normal Mood





- Skin


Skin Exam: Dry, Intact, Normal Color, Warm





Assessment and Plan





- Assessment and Plan (Free Text)


Plan: 





Confirmed Tuberculosis


Isolation Precautions


Continue current management - will continue to monitor  





Imaging:


 CXR 10/30: Rostral patchy infiltrate left apex versus bony overlap or even 

bony abnormality at the left for 2nd and 3rd ribs. Consider follow-up two-view 

chest radiography or CT. No potential acute right pulmonary findings.





 CT Chest 10/30: Primarily upper lobe masses and infiltrates including a 

dominant cavitary mass in the right upper lobe. Strong consideration to 

granulomatous disease/ reactivation tuberculosis. Less likely considerations 

have been provided above.Cholelithiasis without CT evidence of acute 

cholecystitis. 





Medications:


 Zosyn by primary started 10/31- last dose today 11/2/17 


 Ethambutol, Isoniazid, Rifampin, Pyridoxine, Pyrazinamide - started 10/31 by 

primary and ID - Dr. Ross 





Results:


 Febrile - Tmax 101.8


 HIV NEGATIVE, rapid flu - negative, legionella- negative, HIV- negative, 

Mycoplasma Igm - negative, strep pneumo - negative


 Procalcitonin - 0.56


 AFBs - + TB x3 


 PPD +5mm induration


 Quantiferon - negative 





Hx HTN





DW Dr. Mathew,


Shannon LEMUS, PGY-1

## 2017-11-03 NOTE — CP.PCM.PN
Subjective





- Date & Time of Evaluation


Date of Evaluation: 11/03/17


Time of Evaluation: 08:00





- Subjective


Subjective: 





Pt no complain. No CP, no SOB, no edema, no diarrhea





Objective





- Vital Signs/Intake and Output


Vital Signs (last 24 hours): 


 











Temp Pulse Resp BP Pulse Ox


 


 98.4 F   86   20   111/66   95 


 


 11/02/17 23:05  11/02/17 23:05  11/02/17 23:05  11/02/17 23:05  11/02/17 23:05











- Medications


Medications: 


 Current Medications





Acetaminophen (Tylenol 325mg Tab)  650 mg PO Q4 PRN


   PRN Reason: Pain, Mild (1-3)


   Last Admin: 11/02/17 21:19 Dose:  650 mg


Albuterol Sulfate (Albuterol 0.083% Inhal Sol (2.5 Mg/3 Ml) Ud)  2.5 mg INH RQ6 

PRN


   PRN Reason: Shortness of Breath


   Last Admin: 11/01/17 07:50 Dose:  2.5 mg


Ethambutol HCl (Myambutol)  800 mg PO DAILY [facility]


   Last Admin: 11/02/17 10:17 Dose:  800 mg


Piperacillin Sod/Tazobactam Sod (Zosyn 3.375 Gm Iv Premix)  3.375 gm in 50 mls 

@ 100 mls/hr IVPB Q8H [facility]


   Last Admin: 11/03/17 04:54 Dose:  100 mls/hr


Isoniazid (Niazid)  300 mg PO DAILY [facility]


   Last Admin: 11/02/17 10:17 Dose:  300 mg


Losartan Potassium (Cozaar)  50 mg PO DAILY [facility]


   Last Admin: 11/02/17 10:17 Dose:  50 mg


Promethazine HCl (Phenergan Syrup)  12.5 mg PO Q6 PRN


   PRN Reason: Cough


Pyrazinamide (Pyrazinamide)  1,000 mg PO DAILY [facility]


   Last Admin: 11/02/17 10:17 Dose:  1,000 mg


Pyridoxine HCl (Vitamin B6 50 Mg Tab)  50 mg PO DAILY [facility]


   Last Admin: 11/02/17 10:17 Dose:  50 mg


Rifampin (Rifampin Cap)  600 mg PO DAILY [facility]


   Last Admin: 11/02/17 10:17 Dose:  600 mg











- Labs


Labs: 


 





 11/01/17 08:35 





 11/01/17 07:13 





 











PT  13.8 SECONDS (9.7-12.2)  H  10/30/17  12:59    


 


INR  1.2   10/30/17  12:59    


 


APTT  38 SECONDS (21-34)  H  10/30/17  12:59    














- Constitutional


Appears: No Acute Distress





- Eye Exam


Eye Exam: Normal appearance





- ENT Exam


ENT Exam: Mucous Membranes Moist





- Neck Exam


Neck Exam: Full ROM.  absent: Lymphadenopathy





- Respiratory Exam


Respiratory Exam: Clear to Ausculation Bilateral.  absent: Rales, Rhonchi, 

Wheezes





- Cardiovascular Exam


Cardiovascular Exam: REGULAR RHYTHM, +S1, +S2.  absent: Gallop, Murmur





- GI/Abdominal Exam


GI & Abdominal Exam: Soft.  absent: Tenderness, Mass





- Extremities Exam


Extremities Exam: Full ROM, Normal Capillary Refill.  absent: Calf Tenderness, 

Joint Swelling, Pedal Edema





Assessment and Plan





- Assessment and Plan (Free Text)


Assessment: 





prob PTB


HTN





Refer to TB clinic of [facility]


Cont meds/ recheck labs

## 2017-11-04 LAB
ALBUMIN/GLOB SERPL: 0.8 {RATIO} (ref 1–2.1)
ALP SERPL-CCNC: 219 U/L (ref 38–126)
ALT SERPL-CCNC: 66 U/L (ref 21–72)
AST SERPL-CCNC: 45 U/L (ref 17–59)
BILIRUB SERPL-MCNC: 1.4 MG/DL (ref 0.2–1.3)
BUN SERPL-MCNC: 10 MG/DL (ref 9–20)
CALCIUM SERPL-MCNC: 8.4 MG/DL (ref 8.6–10.4)
CHLORIDE SERPL-SCNC: 97 MMOL/L (ref 98–107)
CO2 SERPL-SCNC: 21 MMOL/L (ref 22–30)
ERYTHROCYTE [DISTWIDTH] IN BLOOD BY AUTOMATED COUNT: 13.3 % (ref 11.5–14.5)
GLOBULIN SER-MCNC: 4.2 GM/DL (ref 2.2–3.9)
GLUCOSE SERPL-MCNC: 137 MG/DL (ref 75–110)
HCT VFR BLD CALC: 32.7 % (ref 35–51)
MAGNESIUM SERPL-MCNC: 2 MG/DL (ref 1.6–2.3)
MCH RBC QN AUTO: 29.1 PG (ref 27–31)
MCHC RBC AUTO-ENTMCNC: 33.8 G/DL (ref 33–37)
MCV RBC AUTO: 86 FL (ref 80–94)
PLATELET # BLD: 269 K/UL (ref 130–400)
PMV BLD AUTO: 7.7 FL (ref 7.2–11.7)
POTASSIUM SERPL-SCNC: 3.4 MMOL/L (ref 3.6–5.2)
PROT SERPL-MCNC: 7.6 G/DL (ref 6.3–8.3)
SODIUM SERPL-SCNC: 128 MMOL/L (ref 132–148)
WBC # BLD AUTO: 8.2 K/UL (ref 4.8–10.8)

## 2017-11-04 NOTE — CP.PCM.PN
Subjective





- Date & Time of Evaluation


Date of Evaluation: 11/04/17


Time of Evaluation: 07:40





- Subjective


Subjective: 





Pt no complain. No CP, no SOB, no edema, (+) min dry cough


no diarrhea, no n/v





Objective





- Vital Signs/Intake and Output


Vital Signs (last 24 hours): 


 











Temp Pulse Resp BP Pulse Ox


 


 98.4 F   106 H  20   137/77   95 


 


 11/04/17 07:26  11/04/17 07:26  11/04/17 07:26  11/04/17 07:26  11/04/17 07:26








Intake and Output: 


 











 11/04/17 11/04/17





 06:59 18:59


 


Intake Total 1000 


 


Balance 1000 














- Medications


Medications: 


 Current Medications





Acetaminophen (Tylenol 325mg Tab)  650 mg PO Q6H PRN


   PRN Reason: Fever >100.4 F


   Last Admin: 11/03/17 10:11 Dose:  650 mg


Ethambutol HCl (Myambutol)  800 mg PO DAILY [facility]


   Last Admin: 11/03/17 10:11 Dose:  800 mg


Isoniazid (Niazid)  300 mg PO DAILY [facility]


   Last Admin: 11/03/17 10:11 Dose:  300 mg


Losartan Potassium (Cozaar)  50 mg PO DAILY [facility]


   Last Admin: 11/03/17 10:11 Dose:  50 mg


Moxifloxacin HCl (Avelox)  400 mg PO DAILY [facility]


   Last Admin: 11/03/17 10:11 Dose:  400 mg


Pyrazinamide (Pyrazinamide)  1,000 mg PO DAILY [facility]


   Last Admin: 11/03/17 10:11 Dose:  1,000 mg


Pyridoxine HCl (Vitamin B6 50 Mg Tab)  50 mg PO DAILY [facility]


   Last Admin: 11/03/17 10:10 Dose:  50 mg


Rifampin (Rifampin Cap)  600 mg PO DAILY [facility]


   Last Admin: 11/03/17 10:10 Dose:  600 mg











- Labs


Labs: 


 





 11/01/17 08:35 





 11/01/17 07:13 





 











PT  13.8 SECONDS (9.7-12.2)  H  10/30/17  12:59    


 


INR  1.2   10/30/17  12:59    


 


APTT  38 SECONDS (21-34)  H  10/30/17  12:59    














- Constitutional


Appears: Well





- Eye Exam


Eye Exam: Normal appearance





- ENT Exam


ENT Exam: Mucous Membranes Moist





- Neck Exam


Neck Exam: Full ROM.  absent: Lymphadenopathy, Normal Inspection





- Respiratory Exam


Respiratory Exam: Decreased Breath Sounds.  absent: Rales, Rhonchi, Wheezes





- Cardiovascular Exam


Cardiovascular Exam: +S1, +S2.  absent: Gallop, REGULAR RHYTHM, JVD, Murmur





- GI/Abdominal Exam


GI & Abdominal Exam: Soft.  absent: Tenderness, Mass





- Extremities Exam


Extremities Exam: Full ROM, Normal Capillary Refill.  absent: Calf Tenderness, 

Joint Swelling, Pedal Edema





Assessment and Plan





- Assessment and Plan (Free Text)


Assessment: 





Abn CXR/ (+) AFB -  likely PTB


HTN





Cont meds


Seen c/o  [facility]

## 2017-11-05 LAB
ALBUMIN/GLOB SERPL: 0.8 {RATIO} (ref 1–2.1)
ALP SERPL-CCNC: 267 U/L (ref 38–126)
ALT SERPL-CCNC: 56 U/L (ref 21–72)
AST SERPL-CCNC: 36 U/L (ref 17–59)
BASOPHILS # BLD AUTO: 0 K/UL (ref 0–0.2)
BASOPHILS NFR BLD: 0.2 % (ref 0–2)
BILIRUB SERPL-MCNC: 1.3 MG/DL (ref 0.2–1.3)
BUN SERPL-MCNC: 15 MG/DL (ref 9–20)
CALCIUM SERPL-MCNC: 9.3 MG/DL (ref 8.6–10.4)
CHLORIDE SERPL-SCNC: 94 MMOL/L (ref 98–107)
CO2 SERPL-SCNC: 23 MMOL/L (ref 22–30)
EOSINOPHIL # BLD AUTO: 0.1 K/UL (ref 0–0.7)
EOSINOPHIL NFR BLD: 1.1 % (ref 0–4)
ERYTHROCYTE [DISTWIDTH] IN BLOOD BY AUTOMATED COUNT: 13.6 % (ref 11.5–14.5)
GLOBULIN SER-MCNC: 4.8 GM/DL (ref 2.2–3.9)
GLUCOSE SERPL-MCNC: 170 MG/DL (ref 75–110)
HCT VFR BLD CALC: 36.8 % (ref 35–51)
LYMPHOCYTES # BLD AUTO: 1.1 K/UL (ref 1–4.3)
LYMPHOCYTES NFR BLD AUTO: 10.5 % (ref 20–40)
MCH RBC QN AUTO: 28.6 PG (ref 27–31)
MCHC RBC AUTO-ENTMCNC: 33.4 G/DL (ref 33–37)
MCV RBC AUTO: 85.6 FL (ref 80–94)
MONOCYTES # BLD: 1.5 K/UL (ref 0–0.8)
MONOCYTES NFR BLD: 14.7 % (ref 0–10)
NRBC BLD AUTO-RTO: 0 % (ref 0–2)
PLATELET # BLD: 367 K/UL (ref 130–400)
PMV BLD AUTO: 7.5 FL (ref 7.2–11.7)
POTASSIUM SERPL-SCNC: 4.1 MMOL/L (ref 3.6–5.2)
PROT SERPL-MCNC: 8.6 G/DL (ref 6.3–8.3)
SODIUM SERPL-SCNC: 130 MMOL/L (ref 132–148)
WBC # BLD AUTO: 10.3 K/UL (ref 4.8–10.8)

## 2017-11-05 NOTE — CP.PCM.PN
Subjective





- Date & Time of Evaluation


Date of Evaluation: 11/05/17


Time of Evaluation: 07:00





- Subjective


Subjective: 





rx for TB in progress


need 3 neg AFB before discharge





Objective





- Vital Signs/Intake and Output


Vital Signs (last 24 hours): 


 











Temp Pulse Resp BP Pulse Ox


 


 98.3 F   96 H  20   106/73   96 


 


 11/05/17 07:20  11/05/17 07:20  11/05/17 07:20  11/05/17 07:20  11/05/17 07:20











- Medications


Medications: 


 Current Medications





Acetaminophen (Tylenol 325mg Tab)  650 mg PO Q6H PRN


   PRN Reason: Fever >100.4 F


   Last Admin: 11/03/17 10:11 Dose:  650 mg


Ethambutol HCl (Myambutol)  800 mg PO DAILY [facility]


   Last Admin: 11/05/17 09:19 Dose:  800 mg


Isoniazid (Niazid)  300 mg PO DAILY [facility]


   Last Admin: 11/05/17 09:19 Dose:  300 mg


Losartan Potassium (Cozaar)  50 mg PO DAILY [facility]


   Last Admin: 11/05/17 09:19 Dose:  50 mg


Moxifloxacin HCl (Avelox)  400 mg PO DAILY [facility]


   Last Admin: 11/05/17 09:19 Dose:  400 mg


Pyrazinamide (Pyrazinamide)  1,000 mg PO DAILY [facility]


   Last Admin: 11/05/17 09:19 Dose:  1,000 mg


Pyridoxine HCl (Vitamin B6 50 Mg Tab)  50 mg PO DAILY [facility]


   Last Admin: 11/05/17 09:19 Dose:  50 mg


Rifampin (Rifampin Cap)  600 mg PO DAILY [facility]


   Last Admin: 11/05/17 09:18 Dose:  600 mg











- Labs


Labs: 


 





 11/05/17 07:11 





 11/04/17 07:46 





 











PT  13.8 SECONDS (9.7-12.2)  H  10/30/17  12:59    


 


INR  1.2   10/30/17  12:59    


 


APTT  38 SECONDS (21-34)  H  10/30/17  12:59    














- Constitutional


Appears: Non-toxic, Chronically Ill





- Head Exam


Head Exam: NORMOCEPHALIC





- Eye Exam


Eye Exam: PERRL





- ENT Exam


ENT Exam: Mucous Membranes Dry





- Neck Exam


Neck Exam: absent: Lymphadenopathy





- Respiratory Exam


Respiratory Exam: Decreased Breath Sounds





- Cardiovascular Exam


Cardiovascular Exam: REGULAR RHYTHM





- GI/Abdominal Exam


GI & Abdominal Exam: Distended





Assessment and Plan


(1) Hemoptysis


Status: Acute   





(2) Pneumonia


Status: Acute   





(3) Tuberculosis


Status: Acute   





(4) Tuberculosis


Status: Acute

## 2017-11-06 LAB
BASOPHILS # BLD AUTO: 0.1 K/UL (ref 0–0.2)
BASOPHILS NFR BLD: 0.5 % (ref 0–2)
EOSINOPHIL # BLD AUTO: 0.2 K/UL (ref 0–0.7)
EOSINOPHIL NFR BLD: 1.2 % (ref 0–4)
ERYTHROCYTE [DISTWIDTH] IN BLOOD BY AUTOMATED COUNT: 13.5 % (ref 11.5–14.5)
HCT VFR BLD CALC: 37.9 % (ref 35–51)
LYMPHOCYTES # BLD AUTO: 2.1 K/UL (ref 1–4.3)
LYMPHOCYTES NFR BLD AUTO: 16.3 % (ref 20–40)
MCH RBC QN AUTO: 29 PG (ref 27–31)
MCHC RBC AUTO-ENTMCNC: 33.9 G/DL (ref 33–37)
MCV RBC AUTO: 85.6 FL (ref 80–94)
MONOCYTES # BLD: 1.6 K/UL (ref 0–0.8)
MONOCYTES NFR BLD: 12 % (ref 0–10)
NRBC BLD AUTO-RTO: 0.1 % (ref 0–2)
PLATELET # BLD: 457 K/UL (ref 130–400)
PMV BLD AUTO: 7.5 FL (ref 7.2–11.7)
WBC # BLD AUTO: 13.1 K/UL (ref 4.8–10.8)

## 2017-11-06 NOTE — CP.PCM.PN
Subjective





- Date & Time of Evaluation


Date of Evaluation: 11/06/17


Time of Evaluation: 11:20





- Subjective


Subjective: 





patient seen and examined


no further hemoptysis


feel much better


On antituberculous medication








Objective





- Vital Signs/Intake and Output


Vital Signs (last 24 hours): 


 











Temp Pulse Resp BP Pulse Ox


 


 98.4 F   93 H  20   130/82   95 


 


 11/06/17 15:51  11/06/17 15:51  11/06/17 15:51  11/06/17 15:51  11/06/17 15:51











- Medications


Medications: 


 Current Medications





Acetaminophen (Tylenol 325mg Tab)  650 mg PO Q6H PRN


   PRN Reason: Fever >100.4 F


   Last Admin: 11/03/17 10:11 Dose:  650 mg


Ethambutol HCl (Myambutol)  1,200 mg PO DAILY [facility]


Isoniazid (Niazid)  300 mg PO DAILY [facility]


   Last Admin: 11/06/17 10:00 Dose:  300 mg


Losartan Potassium (Cozaar)  50 mg PO DAILY [facility]


   Last Admin: 11/06/17 10:00 Dose:  50 mg


Montelukast Sodium (Singulair)  10 mg PO [facility]


Moxifloxacin HCl (Avelox)  400 mg PO DAILY [facility]


   Last Admin: 11/06/17 10:00 Dose:  400 mg


Pyrazinamide (Pyrazinamide)  1,500 mg PO DAILY [facility]


Pyridoxine HCl (Vitamin B6 50 Mg Tab)  50 mg PO DAILY [facility]


   Last Admin: 11/06/17 10:00 Dose:  50 mg


Rifampin (Rifampin Cap)  600 mg PO DAILY [facility]


   Last Admin: 11/06/17 10:00 Dose:  600 mg











- Labs


Labs: 


 





 11/06/17 07:04 





 11/05/17 18:12 





 











PT  13.8 SECONDS (9.7-12.2)  H  10/30/17  12:59    


 


INR  1.2   10/30/17  12:59    


 


APTT  38 SECONDS (21-34)  H  10/30/17  12:59    














- Head Exam


Head Exam: ATRAUMATIC, NORMOCEPHALIC





- Eye Exam


Eye Exam: Normal appearance





- ENT Exam


ENT Exam: Mucous Membranes Moist





- Neck Exam


Neck Exam: Full ROM





- Respiratory Exam


Respiratory Exam: Clear to Ausculation Bilateral





- Cardiovascular Exam


Cardiovascular Exam: REGULAR RHYTHM





- Extremities Exam


Extremities Exam: Normal Inspection





- Neurological Exam


Neurological Exam: Alert





Assessment and Plan


(1) Tuberculosis


Assessment & Plan: 


continue antituberculous medication


Follow up AFB


Followup chest x-ray


Status: Acute   





(2) Hemoptysis


Status: Acute

## 2017-11-06 NOTE — CP.PCM.PN
Subjective





- Date & Time of Evaluation


Date of Evaluation: 11/06/17


Time of Evaluation: 09:15





- Subjective


Subjective: 





Patient no complain; (+) itchy throat and dry cough.


ID noted appreciated. NO CP, no SOB, no edema





Objective





- Vital Signs/Intake and Output


Vital Signs (last 24 hours): 


 











Temp Pulse Resp BP Pulse Ox


 


 98.0 F   100 H  20   133/85   95 


 


 11/06/17 08:29  11/06/17 08:29  11/06/17 08:29  11/06/17 08:29  11/06/17 08:29











- Medications


Medications: 


 Current Medications





Acetaminophen (Tylenol 325mg Tab)  650 mg PO Q6H PRN


   PRN Reason: Fever >100.4 F


   Last Admin: 11/03/17 10:11 Dose:  650 mg


Ethambutol HCl (Myambutol)  800 mg PO DAILY [facility]


   Last Admin: 11/05/17 09:19 Dose:  800 mg


Isoniazid (Niazid)  300 mg PO DAILY [facility]


   Last Admin: 11/05/17 09:19 Dose:  300 mg


Losartan Potassium (Cozaar)  50 mg PO DAILY [facility]


   Last Admin: 11/05/17 09:19 Dose:  50 mg


Montelukast Sodium (Singulair)  10 mg PO [facility]


Moxifloxacin HCl (Avelox)  400 mg PO DAILY [facility]


   Last Admin: 11/05/17 09:19 Dose:  400 mg


Pyrazinamide (Pyrazinamide)  1,000 mg PO DAILY [facility]


   Last Admin: 11/05/17 09:19 Dose:  1,000 mg


Pyridoxine HCl (Vitamin B6 50 Mg Tab)  50 mg PO DAILY [facility]


   Last Admin: 11/05/17 09:19 Dose:  50 mg


Rifampin (Rifampin Cap)  600 mg PO DAILY [facility]


   Last Admin: 11/05/17 09:18 Dose:  600 mg











- Labs


Labs: 


 





 11/06/17 07:04 





 11/05/17 18:12 





 











PT  13.8 SECONDS (9.7-12.2)  H  10/30/17  12:59    


 


INR  1.2   10/30/17  12:59    


 


APTT  38 SECONDS (21-34)  H  10/30/17  12:59    














- Constitutional


Appears: No Acute Distress





- Eye Exam


Eye Exam: Normal appearance





- ENT Exam


ENT Exam: Mucous Membranes Moist





- Neck Exam


Neck Exam: Full ROM.  absent: Lymphadenopathy, Thyromegaly





- Respiratory Exam


Respiratory Exam: Decreased Breath Sounds, Wheezes.  absent: Rales, Rhonchi





- Cardiovascular Exam


Cardiovascular Exam: REGULAR RHYTHM, +S1, +S2.  absent: Gallop, JVD, Murmur





- GI/Abdominal Exam


GI & Abdominal Exam: Soft.  absent: Tenderness, Mass





- Extremities Exam


Extremities Exam: Full ROM, Normal Capillary Refill.  absent: Calf Tenderness, 

Joint Swelling, Pedal Edema





Assessment and Plan





- Assessment and Plan (Free Text)


Assessment: 





PTB; HTN





Need AFB x 3 before to stop isolation


Cont meds


Add Singulair

## 2017-11-07 LAB
ALBUMIN/GLOB SERPL: 1 {RATIO} (ref 1–2.1)
ALP SERPL-CCNC: 230 U/L (ref 38–126)
ALT SERPL-CCNC: 53 U/L (ref 21–72)
AST SERPL-CCNC: 40 U/L (ref 17–59)
BASOPHILS # BLD AUTO: 0 K/UL (ref 0–0.2)
BASOPHILS NFR BLD: 0.4 % (ref 0–2)
BILIRUB SERPL-MCNC: 0.8 MG/DL (ref 0.2–1.3)
BUN SERPL-MCNC: 15 MG/DL (ref 9–20)
CALCIUM SERPL-MCNC: 8.8 MG/DL (ref 8.6–10.4)
CHLORIDE SERPL-SCNC: 96 MMOL/L (ref 98–107)
CO2 SERPL-SCNC: 23 MMOL/L (ref 22–30)
EOSINOPHIL # BLD AUTO: 0.1 K/UL (ref 0–0.7)
EOSINOPHIL NFR BLD: 1.2 % (ref 0–4)
ERYTHROCYTE [DISTWIDTH] IN BLOOD BY AUTOMATED COUNT: 13.5 % (ref 11.5–14.5)
GLOBULIN SER-MCNC: 3.8 GM/DL (ref 2.2–3.9)
GLUCOSE SERPL-MCNC: 116 MG/DL (ref 75–110)
HCT VFR BLD CALC: 36.9 % (ref 35–51)
LYMPHOCYTES # BLD AUTO: 1.2 K/UL (ref 1–4.3)
LYMPHOCYTES NFR BLD AUTO: 10.6 % (ref 20–40)
MCH RBC QN AUTO: 29 PG (ref 27–31)
MCHC RBC AUTO-ENTMCNC: 33.9 G/DL (ref 33–37)
MCV RBC AUTO: 85.6 FL (ref 80–94)
MONOCYTES # BLD: 1.5 K/UL (ref 0–0.8)
MONOCYTES NFR BLD: 13 % (ref 0–10)
NRBC BLD AUTO-RTO: 0 % (ref 0–2)
PLATELET # BLD: 502 K/UL (ref 130–400)
PMV BLD AUTO: 7.2 FL (ref 7.2–11.7)
POTASSIUM SERPL-SCNC: 4.1 MMOL/L (ref 3.6–5.2)
PROT SERPL-MCNC: 7.8 G/DL (ref 6.3–8.3)
SODIUM SERPL-SCNC: 132 MMOL/L (ref 132–148)
URATE SERPL-MCNC: 9.2 MG/DL (ref 3.5–8.5)
WBC # BLD AUTO: 11.4 K/UL (ref 4.8–10.8)

## 2017-11-07 NOTE — CP.PCM.PN
Subjective





- Date & Time of Evaluation


Date of Evaluation: 11/07/17


Time of Evaluation: 08:39





- Subjective


Subjective: 





Pt no complain. No CP, no SOB, no edema, (+) dry cough most of the times.


has sputum x 2nd today





Objective





- Vital Signs/Intake and Output


Vital Signs (last 24 hours): 


 











Temp Pulse Resp BP Pulse Ox


 


 97.7 F   94 H  20   126/83   95 


 


 11/07/17 07:59  11/07/17 07:59  11/07/17 07:59  11/07/17 07:59  11/07/17 07:59








Intake and Output: 


 











 11/07/17 11/07/17





 06:59 18:59


 


Intake Total 800 


 


Balance 800 














- Medications


Medications: 


 Current Medications





Acetaminophen (Tylenol 325mg Tab)  650 mg PO Q6H PRN


   PRN Reason: Fever >100.4 F


   Last Admin: 11/03/17 10:11 Dose:  650 mg


Ethambutol HCl (Myambutol)  1,200 mg PO DAILY [facility]


Isoniazid (Niazid)  300 mg PO DAILY [facility]


   Last Admin: 11/06/17 10:00 Dose:  300 mg


Losartan Potassium (Cozaar)  50 mg PO DAILY [facility]


   Last Admin: 11/06/17 10:00 Dose:  50 mg


Montelukast Sodium (Singulair)  10 mg PO HS [facility]


   Last Admin: 11/06/17 21:22 Dose:  10 mg


Moxifloxacin HCl (Avelox)  400 mg PO DAILY [facility]


   Last Admin: 11/06/17 10:00 Dose:  400 mg


Pyrazinamide (Pyrazinamide)  1,500 mg PO DAILY [facility]


Pyridoxine HCl (Vitamin B6 50 Mg Tab)  50 mg PO DAILY [facility]


   Last Admin: 11/06/17 10:00 Dose:  50 mg


Rifampin (Rifampin Cap)  600 mg PO DAILY [facility]


   Last Admin: 11/06/17 10:00 Dose:  600 mg











- Labs


Labs: 


 





 11/07/17 08:00 





 11/07/17 08:00 





 











PT  13.8 SECONDS (9.7-12.2)  H  10/30/17  12:59    


 


INR  1.2   10/30/17  12:59    


 


APTT  38 SECONDS (21-34)  H  10/30/17  12:59    














- Constitutional


Appears: No Acute Distress





- Eye Exam


Eye Exam: Normal appearance





- ENT Exam


ENT Exam: Mucous Membranes Moist





- Neck Exam


Neck Exam: Full ROM.  absent: Lymphadenopathy, Normal Inspection





- Respiratory Exam


Respiratory Exam: Decreased Breath Sounds, Wheezes.  absent: Rales, Rhonchi





- Cardiovascular Exam


Cardiovascular Exam: +S1, +S2, Murmur.  absent: Gallop, REGULAR RHYTHM, JVD





- GI/Abdominal Exam


GI & Abdominal Exam: Soft.  absent: Tenderness, Mass





- Extremities Exam


Extremities Exam: Full ROM.  absent: Calf Tenderness, Joint Swelling, Pedal 

Edema





Assessment and Plan





- Assessment and Plan (Free Text)


Assessment: 





PTB, HTN





Cont meds


For AFB x3 before stopping  isolation

## 2017-11-08 NOTE — CP.PCM.PN
Subjective





- Date & Time of Evaluation


Date of Evaluation: 11/09/17


Time of Evaluation: 08:20





- Subjective


Subjective: 





Pt feels well. Walk 20 mins in the room.


Feels much better, (+) no cough this morning


No CP, no SOB, no edema, no n/v





Objective





- Vital Signs/Intake and Output


Vital Signs (last 24 hours): 


 











Temp Pulse Resp BP Pulse Ox


 


 97.4 F L  110 H  20   140/80   95 


 


 11/08/17 07:48  11/08/17 07:48  11/08/17 07:48  11/08/17 07:48  11/08/17 07:48








Intake and Output: 


 











 11/08/17 11/08/17





 06:59 18:59


 


Intake Total 1000 


 


Balance 1000 














- Medications


Medications: 


 Current Medications





Acetaminophen (Tylenol 325mg Tab)  650 mg PO Q6H PRN


   PRN Reason: Fever >100.4 F


   Last Admin: 11/03/17 10:11 Dose:  650 mg


Ethambutol HCl (Myambutol)  1,200 mg PO DAILY [facility]


   Last Admin: 11/07/17 12:41 Dose:  1,200 mg


Isoniazid (Niazid)  300 mg PO DAILY [facility]


   Last Admin: 11/07/17 09:50 Dose:  300 mg


Losartan Potassium (Cozaar)  50 mg PO DAILY [facility]


   Last Admin: 11/07/17 09:50 Dose:  50 mg


Montelukast Sodium (Singulair)  10 mg PO HS [facility]


   Last Admin: 11/07/17 21:03 Dose:  10 mg


Moxifloxacin HCl (Avelox)  400 mg PO DAILY [facility]


   Last Admin: 11/07/17 09:49 Dose:  400 mg


Pyrazinamide (Pyrazinamide)  1,500 mg PO DAILY [facility]


   Last Admin: 11/07/17 09:50 Dose:  1,500 mg


Pyridoxine HCl (Vitamin B6)  50 mg PO DAILY [facility]


   Last Admin: 11/07/17 09:52 Dose:  50 mg


Rifampin (Rifampin Cap)  600 mg PO DAILY [facility]


   Last Admin: 11/07/17 09:50 Dose:  600 mg











- Labs


Labs: 


 





 11/07/17 08:00 





 11/07/17 08:00 





 











PT  13.8 SECONDS (9.7-12.2)  H  10/30/17  12:59    


 


INR  1.2   10/30/17  12:59    


 


APTT  38 SECONDS (21-34)  H  10/30/17  12:59    














- Constitutional


Appears: No Acute Distress





- Eye Exam


Eye Exam: Normal appearance





- ENT Exam


ENT Exam: Mucous Membranes Moist





- Neck Exam


Neck Exam: Full ROM.  absent: Lymphadenopathy, Thyromegaly





- Respiratory Exam


Respiratory Exam: Decreased Breath Sounds, Wheezes.  absent: Rales, Rhonchi





- Cardiovascular Exam


Cardiovascular Exam: REGULAR RHYTHM, +S1, +S2.  absent: Gallop





- GI/Abdominal Exam


GI & Abdominal Exam: Soft.  absent: Tenderness, Mass





- Extremities Exam


Extremities Exam: Joint Swelling.  absent: Full ROM, Normal Capillary Refill, 

Pedal Edema





Assessment and Plan





- Assessment and Plan (Free Text)


Assessment: 





HTN, PTB


Inc Platelet ?? reactive





Cont meds/ supportive care

## 2017-11-08 NOTE — CP.PCM.PN
Subjective





- Date & Time of Evaluation


Date of Evaluation: 11/08/17


Time of Evaluation: 09:00





- Subjective


Subjective: 





Denies cough, denies fevers chills, denies hemoptysis


Being treated for tuberculosis





Objective





- Vital Signs/Intake and Output


Vital Signs (last 24 hours): 


 











Temp Pulse Resp BP Pulse Ox


 


 98.1 F   99 H  20   131/78   95 


 


 11/08/17 15:43  11/08/17 15:43  11/08/17 15:43  11/08/17 15:43  11/08/17 15:43








Intake and Output: 


 











 11/08/17 11/08/17





 06:59 18:59


 


Intake Total 1000 480


 


Balance 1000 480














- Medications


Medications: 


 Current Medications





Acetaminophen (Tylenol 325mg Tab)  650 mg PO Q6H PRN


   PRN Reason: Fever >100.4 F


   Last Admin: 11/03/17 10:11 Dose:  650 mg


Ethambutol HCl (Myambutol)  1,200 mg PO DAILY [facility]


   Last Admin: 11/08/17 09:19 Dose:  1,200 mg


Isoniazid (Niazid)  300 mg PO DAILY [facility]


   Last Admin: 11/08/17 09:19 Dose:  300 mg


Losartan Potassium (Cozaar)  50 mg PO DAILY [facility]


   Last Admin: 11/08/17 09:19 Dose:  50 mg


Montelukast Sodium (Singulair)  10 mg PO HS [facility]


   Last Admin: 11/07/17 21:03 Dose:  10 mg


Moxifloxacin HCl (Avelox)  400 mg PO DAILY [facility]


   Last Admin: 11/08/17 09:19 Dose:  400 mg


Pyrazinamide (Pyrazinamide)  1,500 mg PO DAILY [facility]


   Last Admin: 11/08/17 09:18 Dose:  1,500 mg


Pyridoxine HCl (Vitamin B6)  50 mg PO DAILY [facility]


   Last Admin: 11/08/17 09:18 Dose:  50 mg


Rifampin (Rifampin Cap)  600 mg PO DAILY [facility]


   Last Admin: 11/08/17 09:19 Dose:  600 mg











- Labs


Labs: 


 





 11/07/17 08:00 





 11/07/17 08:00 





 











PT  13.8 SECONDS (9.7-12.2)  H  10/30/17  12:59    


 


INR  1.2   10/30/17  12:59    


 


APTT  38 SECONDS (21-34)  H  10/30/17  12:59    














- Constitutional


Appears: Non-toxic, Chronically Ill





- Head Exam


Head Exam: NORMOCEPHALIC





- Eye Exam


Eye Exam: EOMI





- ENT Exam


ENT Exam: Mucous Membranes Dry





- Neck Exam


Neck Exam: absent: Lymphadenopathy





- Respiratory Exam


Respiratory Exam: Decreased Breath Sounds





Assessment and Plan


(1) Hemoptysis


Status: Acute   





(2) Pneumonia


Status: Acute   





(3) Tuberculosis


Status: Acute   





(4) Tuberculosis


Status: Acute

## 2017-11-08 NOTE — CP.PCM.PN
Subjective





- Date & Time of Evaluation


Date of Evaluation: 11/08/17


Time of Evaluation: 10:00





- Subjective


Subjective: 





Patient seen and examined.


Denies cough, denies fevers chills, denies hemoptysis


Being treated for tuberculosis


Fair appetite








Objective





- Vital Signs/Intake and Output


Vital Signs (last 24 hours): 


 











Temp Pulse Resp BP Pulse Ox


 


 97.4 F L  110 H  20   140/80   95 


 


 11/08/17 07:48  11/08/17 07:48  11/08/17 07:48  11/08/17 07:48  11/08/17 07:48








Intake and Output: 


 











 11/08/17 11/08/17





 06:59 18:59


 


Intake Total 1000 


 


Balance 1000 














- Medications


Medications: 


 Current Medications





Acetaminophen (Tylenol 325mg Tab)  650 mg PO Q6H PRN


   PRN Reason: Fever >100.4 F


   Last Admin: 11/03/17 10:11 Dose:  650 mg


Ethambutol HCl (Myambutol)  1,200 mg PO DAILY [facility]


   Last Admin: 11/08/17 09:19 Dose:  1,200 mg


Isoniazid (Niazid)  300 mg PO DAILY [facility]


   Last Admin: 11/08/17 09:19 Dose:  300 mg


Losartan Potassium (Cozaar)  50 mg PO DAILY [facility]


   Last Admin: 11/08/17 09:19 Dose:  50 mg


Montelukast Sodium (Singulair)  10 mg PO HS [facility]


   Last Admin: 11/07/17 21:03 Dose:  10 mg


Moxifloxacin HCl (Avelox)  400 mg PO DAILY [facility]


   Last Admin: 11/08/17 09:19 Dose:  400 mg


Pyrazinamide (Pyrazinamide)  1,500 mg PO DAILY [facility]


   Last Admin: 11/08/17 09:18 Dose:  1,500 mg


Pyridoxine HCl (Vitamin B6)  50 mg PO DAILY [facility]


   Last Admin: 11/08/17 09:18 Dose:  50 mg


Rifampin (Rifampin Cap)  600 mg PO DAILY [facility]


   Last Admin: 11/08/17 09:19 Dose:  600 mg











- Labs


Labs: 


 





 11/07/17 08:00 





 11/07/17 08:00 





 











PT  13.8 SECONDS (9.7-12.2)  H  10/30/17  12:59    


 


INR  1.2   10/30/17  12:59    


 


APTT  38 SECONDS (21-34)  H  10/30/17  12:59    














- Constitutional


Appears: No Acute Distress





- Head Exam


Head Exam: ATRAUMATIC, NORMOCEPHALIC





- Eye Exam


Eye Exam: Normal appearance





- ENT Exam


ENT Exam: Mucous Membranes Moist





- Neck Exam


Neck Exam: Full ROM, Normal Inspection





- Respiratory Exam


Respiratory Exam: Clear to Ausculation Bilateral





- Cardiovascular Exam


Cardiovascular Exam: REGULAR RHYTHM





- GI/Abdominal Exam


GI & Abdominal Exam: Soft, Normal Bowel Sounds





- Extremities Exam


Extremities Exam: Normal Inspection





Assessment and Plan


(1) Tuberculosis


Assessment & Plan: 


Continue antituberculosis medication


Follow-up culture and sensitivity


Check sputum AFB 3


Status: Acute   





(2) Hemoptysis


Status: Acute

## 2017-11-09 RX ADMIN — FLUTICASONE PROPIONATE AND SALMETEROL SCH PUFF: 50; 250 POWDER RESPIRATORY (INHALATION) at 20:34

## 2017-11-09 NOTE — RAD
HISTORY:

() cavity  



COMPARISON:

Chest radiograph 10/30/2017.



TECHNIQUE:

Chest PA and lateral, with apical lordotic views.



FINDINGS:



LUNGS:

Persistent bilateral upper lobe infiltrates are appreciated 

potentially slightly increased at the right side when compared to 

prior chest 10/30/2017. Apical lordotic views demonstrate the 

infiltrates however no nodularity or fibrotic changes are 

definitively appreciated. A calcified granuloma is difficult to 

differentiate from a vessel on end at the right chest laterally. No 

calcified granuloma is seen at the left chest



PLEURA:

No significant pleural effusion identified. No pneumothorax apparent.



CARDIOVASCULAR:

Normal.



OSSEOUS STRUCTURES:

No significant abnormalities.



VISUALIZED UPPER ABDOMEN:

Normal.



OTHER FINDINGS:

None.



IMPRESSION:

Bilateral upper lobe infiltrates are identified including the apices, 

potentially a function of granulomatous infection though not 

definitively. Mild increase in infiltrate at the right chest may be 

present.  Continued clinical and radiographic monitoring are advised.

## 2017-11-09 NOTE — CP.PCM.PN
Subjective





- Date & Time of Evaluation


Date of Evaluation: 11/09/17


Time of Evaluation: 08:15





- Subjective


Subjective: 





Pt no complain; ready to go home


No CP, no cough, no edema, (+) minimal productive cough





Objective





- Vital Signs/Intake and Output


Vital Signs (last 24 hours): 


 











Temp Pulse Resp BP Pulse Ox


 


 97.9 F   112 H  20   118/81   97 


 


 11/09/17 07:36  11/09/17 07:36  11/09/17 07:36  11/09/17 07:36  11/09/17 07:36











- Medications


Medications: 


 Current Medications





Acetaminophen (Tylenol 325mg Tab)  650 mg PO Q6H PRN


   PRN Reason: Fever >100.4 F


   Last Admin: 11/03/17 10:11 Dose:  650 mg


Ethambutol HCl (Myambutol)  1,200 mg PO DAILY [facility]


   Last Admin: 11/08/17 09:19 Dose:  1,200 mg


Isoniazid (Niazid)  300 mg PO DAILY [facility]


   Last Admin: 11/08/17 09:19 Dose:  300 mg


Losartan Potassium (Cozaar)  50 mg PO DAILY [facility]


   Last Admin: 11/08/17 09:19 Dose:  50 mg


Montelukast Sodium (Singulair)  10 mg PO [facility]


   Last Admin: 11/08/17 21:49 Dose:  10 mg


Moxifloxacin HCl (Avelox)  400 mg PO DAILY [facility]


   Last Admin: 11/08/17 09:19 Dose:  400 mg


Pyrazinamide (Pyrazinamide)  1,500 mg PO DAILY [facility]


   Last Admin: 11/08/17 09:18 Dose:  1,500 mg


Pyridoxine HCl (Vitamin B6)  50 mg PO DAILY [facility]


   Last Admin: 11/08/17 09:18 Dose:  50 mg


Rifampin (Rifampin Cap)  600 mg PO DAILY [facility]


   Last Admin: 11/08/17 09:19 Dose:  600 mg











- Labs


Labs: 


 





 11/07/17 08:00 





 11/07/17 08:00 





 











PT  13.8 SECONDS (9.7-12.2)  H  10/30/17  12:59    


 


INR  1.2   10/30/17  12:59    


 


APTT  38 SECONDS (21-34)  H  10/30/17  12:59    














- Constitutional


Appears: No Acute Distress





- Eye Exam


Eye Exam: Normal appearance





- ENT Exam


ENT Exam: Mucous Membranes Moist





- Neck Exam


Neck Exam: Full ROM.  absent: Lymphadenopathy, Thyromegaly





- Respiratory Exam


Respiratory Exam: Decreased Breath Sounds, Wheezes.  absent: Rales, Rhonchi





- Cardiovascular Exam


Cardiovascular Exam: REGULAR RHYTHM, +S1, +S2.  absent: Gallop, JVD, Murmur





- GI/Abdominal Exam


GI & Abdominal Exam: Soft.  absent: Tenderness, Mass





- Extremities Exam


Extremities Exam: Full ROM, Normal Capillary Refill.  absent: Calf Tenderness, 

Joint Swelling, Pedal Edema





Assessment and Plan





- Assessment and Plan (Free Text)


Assessment: 





PTB w/ wheezing; HTN





Cont meds/ Add advair


Repeat CT


F/up AFB; Negative x 1

## 2017-11-10 RX ADMIN — FLUTICASONE PROPIONATE AND SALMETEROL SCH PUFF: 50; 250 POWDER RESPIRATORY (INHALATION) at 07:21

## 2017-11-10 RX ADMIN — FLUTICASONE PROPIONATE AND SALMETEROL SCH PUFF: 50; 250 POWDER RESPIRATORY (INHALATION) at 20:44

## 2017-11-10 NOTE — CP.PCM.PN
Subjective





- Date & Time of Evaluation


Date of Evaluation: 11/10/17


Time of Evaluation: 07:45





- Subjective


Subjective: 





Patient no complain; good appetite, no fever, nash dec cough.


AFB (+) on 11/07/17


No CP, no SOB, no edema, no n/v, no diarrhea





Objective





- Vital Signs/Intake and Output


Vital Signs (last 24 hours): 


 











Temp Pulse Resp BP Pulse Ox


 


 97.9 F   86   20   106/64   95 


 


 11/09/17 23:30  11/09/17 23:30  11/09/17 23:30  11/09/17 23:30  11/09/17 23:30








Intake and Output: 


 











 11/10/17 11/10/17





 06:59 18:59


 


Intake Total  150


 


Balance  150














- Medications


Medications: 


 Current Medications





Ethambutol HCl (Myambutol)  1,200 mg PO DAILY [facility]


   Last Admin: 11/09/17 09:04 Dose:  1,200 mg


Isoniazid (Niazid)  300 mg PO DAILY [facility]


   Last Admin: 11/09/17 09:04 Dose:  300 mg


Losartan Potassium (Cozaar)  50 mg PO DAILY [facility]


   Last Admin: 11/09/17 09:04 Dose:  50 mg


Montelukast Sodium (Singulair)  10 mg PO HS [facility]


   Last Admin: 11/09/17 21:19 Dose:  10 mg


Moxifloxacin HCl (Avelox)  400 mg PO DAILY [facility]


   Last Admin: 11/09/17 09:04 Dose:  400 mg


Pyrazinamide (Pyrazinamide)  1,500 mg PO DAILY [facility]


   Last Admin: 11/09/17 09:04 Dose:  1,500 mg


Pyridoxine HCl (Vitamin B6 50 Mg Tab)  50 mg PO DAILY [facility]


Rifampin (Rifampin Cap)  600 mg PO DAILY [facility]


   Last Admin: 11/09/17 09:04 Dose:  600 mg


Fluticasone/Salmeterol (Advair Diskus 250/50)  1 puff INH RQ12 [facility]


   Last Admin: 11/10/17 07:21 Dose:  1 puff











- Labs


Labs: 


 





 11/07/17 08:00 





 11/07/17 08:00 





 











PT  13.8 SECONDS (9.7-12.2)  H  10/30/17  12:59    


 


INR  1.2   10/30/17  12:59    


 


APTT  38 SECONDS (21-34)  H  10/30/17  12:59    














- Head Exam


Head Exam: NORMAL INSPECTION





- Eye Exam


Eye Exam: Normal appearance





- ENT Exam


ENT Exam: Mucous Membranes Moist





- Neck Exam


Neck Exam: Full ROM.  absent: Lymphadenopathy





- Respiratory Exam


Respiratory Exam: Clear to Ausculation Bilateral.  absent: Rales, Rhonchi, 

Wheezes





- Cardiovascular Exam


Cardiovascular Exam: REGULAR RHYTHM, +S1, +S2.  absent: Gallop, JVD, Murmur





- GI/Abdominal Exam


GI & Abdominal Exam: Soft.  absent: Tenderness, Mass





- Extremities Exam


Extremities Exam: Full ROM.  absent: Calf Tenderness, Normal Capillary Refill, 

Pedal Edema





Assessment and Plan





- Assessment and Plan (Free Text)


Assessment: 





HTN; prob PTB w/ wheezing





CXR and AFB noted


cont meds


Pt azul aware c/o still need to be isolated

## 2017-11-11 RX ADMIN — FLUTICASONE PROPIONATE AND SALMETEROL SCH PUFF: 50; 250 POWDER RESPIRATORY (INHALATION) at 10:04

## 2017-11-11 RX ADMIN — FLUTICASONE PROPIONATE AND SALMETEROL SCH PUFF: 50; 250 POWDER RESPIRATORY (INHALATION) at 20:08

## 2017-11-12 RX ADMIN — FLUTICASONE PROPIONATE AND SALMETEROL SCH PUFF: 50; 250 POWDER RESPIRATORY (INHALATION) at 20:46

## 2017-11-12 RX ADMIN — FLUTICASONE PROPIONATE AND SALMETEROL SCH PUFF: 50; 250 POWDER RESPIRATORY (INHALATION) at 10:13

## 2017-11-13 RX ADMIN — FLUTICASONE PROPIONATE AND SALMETEROL SCH PUFF: 50; 250 POWDER RESPIRATORY (INHALATION) at 10:34

## 2017-11-13 RX ADMIN — FLUTICASONE PROPIONATE AND SALMETEROL SCH PUFF: 50; 250 POWDER RESPIRATORY (INHALATION) at 20:32

## 2017-11-13 NOTE — CP.PCM.PN
Subjective





- Date & Time of Evaluation


Date of Evaluation: 11/13/17


Time of Evaluation: 08:15





- Subjective


Subjective: 





Pt no complain; no CP, no SOB, (+) dry cough





Objective





- Vital Signs/Intake and Output


Vital Signs (last 24 hours): 


 











Temp Pulse Resp BP Pulse Ox


 


 98.3 F   77   20   117/70   95 


 


 11/13/17 00:00  11/13/17 00:00  11/13/17 00:00  11/13/17 00:00  11/13/17 00:00








Intake and Output: 


 











 11/13/17 11/13/17





 06:59 18:59


 


Intake Total 600 


 


Balance 600 














- Medications


Medications: 


 Current Medications





Ethambutol HCl (Myambutol)  1,200 mg PO DAILY [facility]


   Last Admin: 11/12/17 09:58 Dose:  1,200 mg


Isoniazid (Niazid)  300 mg PO DAILY [facility]


   Last Admin: 11/12/17 09:59 Dose:  300 mg


Losartan Potassium (Cozaar)  50 mg PO DAILY [facility]


   Last Admin: 11/12/17 09:59 Dose:  50 mg


Montelukast Sodium (Singulair)  10 mg PO HS [facility]


   Last Admin: 11/12/17 21:47 Dose:  10 mg


Moxifloxacin HCl (Avelox)  400 mg PO DAILY [facility]


   Last Admin: 11/12/17 09:59 Dose:  400 mg


Pyrazinamide (Pyrazinamide)  1,500 mg PO DAILY [facility]


   Last Admin: 11/12/17 09:58 Dose:  1,500 mg


Pyridoxine HCl (Vitamin B6 50 Mg Tab)  50 mg PO DAILY [facility]


   Last Admin: 11/12/17 09:58 Dose:  50 mg


Rifampin (Rifampin Cap)  600 mg PO DAILY [facility]


   Last Admin: 11/12/17 09:59 Dose:  600 mg


Fluticasone/Salmeterol (Advair Diskus 250/50)  1 puff INH RQ12 [facility]


   Last Admin: 11/12/17 20:46 Dose:  1 puff











- Labs


Labs: 


 





 11/07/17 08:00 





 11/07/17 08:00 





 











PT  13.8 SECONDS (9.7-12.2)  H  10/30/17  12:59    


 


INR  1.2   10/30/17  12:59    


 


APTT  38 SECONDS (21-34)  H  10/30/17  12:59    














- Constitutional


Appears: No Acute Distress





- Eye Exam


Eye Exam: Normal appearance





- ENT Exam


ENT Exam: Mucous Membranes Moist





- Neck Exam


Neck Exam: Full ROM





- Respiratory Exam


Respiratory Exam: Clear to Ausculation Bilateral.  absent: Rales, Rhonchi, 

Wheezes





- Cardiovascular Exam


Cardiovascular Exam: REGULAR RHYTHM, RRR, +S1, +S2.  absent: Gallop, JVD





- GI/Abdominal Exam


GI & Abdominal Exam: Soft.  absent: Tenderness





- Extremities Exam


Extremities Exam: Full ROM, Normal Capillary Refill.  absent: Joint Swelling, 

Pedal Edema





Assessment and Plan





- Assessment and Plan (Free Text)


Plan: 





Pneumonia w/ PTB


HTN








Cont meds


Avelox x 14 days


Repeat AFB

## 2017-11-14 LAB
ALBUMIN/GLOB SERPL: 1.1 {RATIO} (ref 1–2.1)
ALP SERPL-CCNC: 152 U/L (ref 38–126)
ALT SERPL-CCNC: 53 U/L (ref 21–72)
AST SERPL-CCNC: 59 U/L (ref 17–59)
BASOPHILS # BLD AUTO: 0.1 K/UL (ref 0–0.2)
BASOPHILS NFR BLD: 0.8 % (ref 0–2)
BILIRUB SERPL-MCNC: 0.7 MG/DL (ref 0.2–1.3)
BUN SERPL-MCNC: 19 MG/DL (ref 9–20)
CALCIUM SERPL-MCNC: 8.9 MG/DL (ref 8.6–10.4)
CHLORIDE SERPL-SCNC: 100 MMOL/L (ref 98–107)
CO2 SERPL-SCNC: 23 MMOL/L (ref 22–30)
EOSINOPHIL # BLD AUTO: 0.1 K/UL (ref 0–0.7)
EOSINOPHIL NFR BLD: 1.5 % (ref 0–4)
ERYTHROCYTE [DISTWIDTH] IN BLOOD BY AUTOMATED COUNT: 14.4 % (ref 11.5–14.5)
GLOBULIN SER-MCNC: 3.7 GM/DL (ref 2.2–3.9)
GLUCOSE SERPL-MCNC: 120 MG/DL (ref 75–110)
HCT VFR BLD CALC: 37.4 % (ref 35–51)
LYMPHOCYTES # BLD AUTO: 1 K/UL (ref 1–4.3)
LYMPHOCYTES NFR BLD AUTO: 10 % (ref 20–40)
MCH RBC QN AUTO: 30 PG (ref 27–31)
MCHC RBC AUTO-ENTMCNC: 34.3 G/DL (ref 33–37)
MCV RBC AUTO: 87.2 FL (ref 80–94)
MONOCYTES # BLD: 0.8 K/UL (ref 0–0.8)
MONOCYTES NFR BLD: 8.2 % (ref 0–10)
NRBC BLD AUTO-RTO: 0.1 % (ref 0–2)
PLATELET # BLD: 492 K/UL (ref 130–400)
PMV BLD AUTO: 6.8 FL (ref 7.2–11.7)
POTASSIUM SERPL-SCNC: 3.9 MMOL/L (ref 3.6–5.2)
PROT SERPL-MCNC: 7.8 G/DL (ref 6.3–8.3)
SODIUM SERPL-SCNC: 135 MMOL/L (ref 132–148)
WBC # BLD AUTO: 9.9 K/UL (ref 4.8–10.8)

## 2017-11-14 RX ADMIN — FLUTICASONE PROPIONATE AND SALMETEROL SCH PUFF: 50; 250 POWDER RESPIRATORY (INHALATION) at 08:00

## 2017-11-14 RX ADMIN — FLUTICASONE PROPIONATE AND SALMETEROL SCH PUFF: 50; 250 POWDER RESPIRATORY (INHALATION) at 20:50

## 2017-11-14 NOTE — CP.PCM.PN
Subjective





- Date & Time of Evaluation


Date of Evaluation: 11/14/17


Time of Evaluation: 10:08





- Subjective


Subjective: 





Patient no complain; minimal mucus in AM


NO CP, no SOB, no edema, no n/v; good appetite


Doing upper body exercise in his room





Objective





- Vital Signs/Intake and Output


Vital Signs (last 24 hours): 


 











Temp Pulse Resp BP Pulse Ox


 


 98.1 F   109 H  20   122/69   96 


 


 11/14/17 08:00  11/14/17 08:00  11/14/17 08:00  11/14/17 08:00  11/14/17 08:00








Intake and Output: 


 











 11/14/17 11/14/17





 06:59 18:59


 


Intake Total  50


 


Balance  50














- Medications


Medications: 


 Current Medications





Ethambutol HCl (Myambutol)  1,200 mg PO DAILY [facility]


   Last Admin: 11/14/17 09:19 Dose:  1,200 mg


Isoniazid (Niazid)  300 mg PO DAILY [facility]


   Last Admin: 11/14/17 09:20 Dose:  300 mg


Losartan Potassium (Cozaar)  50 mg PO DAILY [facility]


   Last Admin: 11/14/17 09:19 Dose:  50 mg


Montelukast Sodium (Singulair)  10 mg PO HS [facility]


   Last Admin: 11/13/17 21:12 Dose:  10 mg


Moxifloxacin HCl (Avelox)  400 mg PO DAILY [facility]


   Last Admin: 11/14/17 09:19 Dose:  400 mg


Pyrazinamide (Pyrazinamide)  1,500 mg PO DAILY [facility]


   Last Admin: 11/14/17 09:20 Dose:  1,500 mg


Pyridoxine HCl (Vitamin B6 50 Mg Tab)  50 mg PO DAILY [facility]


   Last Admin: 11/14/17 09:20 Dose:  50 mg


Rifampin (Rifampin Cap)  600 mg PO DAILY [facility]


   Last Admin: 11/14/17 09:21 Dose:  600 mg


Fluticasone/Salmeterol (Advair Diskus 250/50)  1 puff INH RQ12 [facility]


   Last Admin: 11/14/17 08:00 Dose:  1 puff











- Labs


Labs: 


 





 11/14/17 07:26 





 11/14/17 07:26 





 











PT  13.8 SECONDS (9.7-12.2)  H  10/30/17  12:59    


 


INR  1.2   10/30/17  12:59    


 


APTT  38 SECONDS (21-34)  H  10/30/17  12:59    














- Constitutional


Appears: No Acute Distress





- Eye Exam


Eye Exam: Normal appearance





- ENT Exam


ENT Exam: Mucous Membranes Moist





- Neck Exam


Neck Exam: Full ROM





- Respiratory Exam


Respiratory Exam: Decreased Breath Sounds, Rhonchi.  absent: Rales, Wheezes





- Cardiovascular Exam


Cardiovascular Exam: REGULAR RHYTHM, +S1, +S2.  absent: Gallop, JVD





- GI/Abdominal Exam


GI & Abdominal Exam: Soft.  absent: Tenderness, Mass





- Extremities Exam


Extremities Exam: Normal Capillary Refill.  absent: Calf Tenderness, Joint 

Swelling, Pedal Edema





Assessment and Plan





- Assessment and Plan (Free Text)


Assessment: 





PTB; Pneumonia


HTN





For AFB x 3 again


Cont meds

## 2017-11-15 RX ADMIN — FLUTICASONE PROPIONATE AND SALMETEROL SCH PUFF: 50; 250 POWDER RESPIRATORY (INHALATION) at 07:22

## 2017-11-15 RX ADMIN — FLUTICASONE PROPIONATE AND SALMETEROL SCH PUFF: 50; 250 POWDER RESPIRATORY (INHALATION) at 20:46

## 2017-11-16 RX ADMIN — FLUTICASONE PROPIONATE AND SALMETEROL SCH PUFF: 50; 250 POWDER RESPIRATORY (INHALATION) at 20:01

## 2017-11-16 RX ADMIN — FLUTICASONE PROPIONATE AND SALMETEROL SCH PUFF: 50; 250 POWDER RESPIRATORY (INHALATION) at 07:28

## 2017-11-16 NOTE — CP.PCM.PN
Subjective





- Date & Time of Evaluation


Date of Evaluation: 11/15/17


Time of Evaluation: 08:15





- Subjective


Subjective: 


Pt no complain; Very minimal cough


No CP, no SOB, no edema, no palpitation, no n/v








Objective





- Vital Signs/Intake and Output


Vital Signs (last 24 hours): 


 











Temp Pulse Resp BP Pulse Ox


 


 98.3 F   108 H  20   113/80   95 


 


 11/16/17 07:04  11/16/17 07:04  11/16/17 07:04  11/16/17 07:04  11/16/17 07:04











- Medications


Medications: 


 Current Medications





Ethambutol HCl (Myambutol)  1,200 mg PO DAILY [facility]


   Last Admin: 11/15/17 09:31 Dose:  1,200 mg


Isoniazid (Niazid)  300 mg PO DAILY [facility]


   Last Admin: 11/15/17 09:31 Dose:  300 mg


Losartan Potassium (Cozaar)  50 mg PO DAILY [facility]


   Last Admin: 11/15/17 09:31 Dose:  50 mg


Montelukast Sodium (Singulair)  10 mg PO [facility]


   Last Admin: 11/15/17 21:08 Dose:  10 mg


Moxifloxacin HCl (Avelox)  400 mg PO DAILY [facility]


   Last Admin: 11/15/17 09:31 Dose:  400 mg


Pyrazinamide (Pyrazinamide)  1,500 mg PO DAILY [facility]


   Last Admin: 11/15/17 09:31 Dose:  1,500 mg


Pyridoxine HCl (Vitamin B6 50 Mg Tab)  50 mg PO DAILY [facility]


   Last Admin: 11/15/17 09:32 Dose:  50 mg


Rifampin (Rifampin Cap)  600 mg PO DAILY [facility]


   Last Admin: 11/15/17 09:31 Dose:  600 mg


Fluticasone/Salmeterol (Advair Diskus 250/50)  1 puff INH RQ12 [facility]


   Last Admin: 11/16/17 07:28 Dose:  1 puff











- Labs


Labs: 


 





 11/14/17 07:26 





 11/14/17 07:26 





 











PT  13.8 SECONDS (9.7-12.2)  H  10/30/17  12:59    


 


INR  1.2   10/30/17  12:59    


 


APTT  38 SECONDS (21-34)  H  10/30/17  12:59    














- Constitutional


Appears: No Acute Distress





- Eye Exam


Eye Exam: Normal appearance





- ENT Exam


ENT Exam: Mucous Membranes Moist





- Neck Exam


Neck Exam: Full ROM.  absent: Lymphadenopathy, Thyromegaly





- Respiratory Exam


Respiratory Exam: Clear to Ausculation Bilateral.  absent: Rales, Rhonchi, 

Wheezes





- GI/Abdominal Exam


GI & Abdominal Exam: Soft.  absent: Tenderness, Mass





- Extremities Exam


Extremities Exam: Full ROM, Normal Capillary Refill.  absent: Calf Tenderness, 

Joint Swelling





Assessment and Plan





- Assessment and Plan (Free Text)


Assessment: 





Pneumonia; PTB - improve


HTN





AFB neg x1; Need 3 for discharge


Cont meds

## 2017-11-17 RX ADMIN — FLUTICASONE PROPIONATE AND SALMETEROL SCH PUFF: 50; 250 POWDER RESPIRATORY (INHALATION) at 19:46

## 2017-11-17 RX ADMIN — FLUTICASONE PROPIONATE AND SALMETEROL SCH PUFF: 50; 250 POWDER RESPIRATORY (INHALATION) at 08:17

## 2017-11-17 NOTE — CP.PCM.PN
Subjective





- Date & Time of Evaluation


Date of Evaluation: 11/17/17


Time of Evaluation: 08:10





- Subjective


Subjective: 





Pt minimal cough; AFB neg x1;  


2 more collected. NO CP, no SOB, no edema, no palpitation, no n/v





Objective





- Vital Signs/Intake and Output


Vital Signs (last 24 hours): 


 











Temp Pulse Resp BP Pulse Ox


 


 98 F   115 H  21   100/72   96 


 


 11/17/17 07:59  11/17/17 07:59  11/17/17 07:59  11/17/17 07:59  11/17/17 07:59








Intake and Output: 


 











 11/17/17 11/17/17





 06:59 18:59


 


Intake Total 150 


 


Balance 150 














- Medications


Medications: 


 Current Medications





Ethambutol HCl (Myambutol)  1,200 mg PO DAILY [facility]


   Last Admin: 11/16/17 09:47 Dose:  1,200 mg


Isoniazid (Niazid)  300 mg PO DAILY [facility]


   Last Admin: 11/16/17 09:48 Dose:  300 mg


Losartan Potassium (Cozaar)  50 mg PO DAILY [facility]


   Last Admin: 11/16/17 09:47 Dose:  50 mg


Montelukast Sodium (Singulair)  10 mg PO HS [facility]


   Last Admin: 11/16/17 22:52 Dose:  10 mg


Moxifloxacin HCl (Avelox)  400 mg PO DAILY [facility]


   Last Admin: 11/16/17 09:48 Dose:  400 mg


Pyrazinamide (Pyrazinamide)  1,500 mg PO DAILY [facility]


   Last Admin: 11/16/17 09:48 Dose:  1,500 mg


Pyridoxine HCl (Vitamin B6 50 Mg Tab)  50 mg PO DAILY [facility]


   Last Admin: 11/16/17 09:49 Dose:  50 mg


Rifampin (Rifampin Cap)  600 mg PO DAILY [facility]


   Last Admin: 11/16/17 09:48 Dose:  600 mg


Fluticasone/Salmeterol (Advair Diskus 250/50)  1 puff INH RQ12 [facility]


   Last Admin: 11/17/17 08:17 Dose:  1 puff











- Labs


Labs: 


 





 11/14/17 07:26 





 11/14/17 07:26 





 











PT  13.8 SECONDS (9.7-12.2)  H  10/30/17  12:59    


 


INR  1.2   10/30/17  12:59    


 


APTT  38 SECONDS (21-34)  H  10/30/17  12:59    














- Constitutional


Appears: No Acute Distress





- Eye Exam


Eye Exam: Normal appearance





- Neck Exam


Neck Exam: Full ROM.  absent: Normal Inspection, Tenderness





- Respiratory Exam


Respiratory Exam: Wheezes.  absent: Decreased Breath Sounds, Rales, Rhonchi





- Cardiovascular Exam


Cardiovascular Exam: +S1, +S2.  absent: Gallop, REGULAR RHYTHM, JVD





- GI/Abdominal Exam


GI & Abdominal Exam: Soft.  absent: Tenderness, Mass





- Extremities Exam


Extremities Exam: absent: Full ROM, Normal Capillary Refill, Pedal Edema





Assessment and Plan





- Assessment and Plan (Free Text)


Assessment: 


Pneumonia; PTB\


HTN; Tachycardia ?? Etio





Cont meds/ supprtive care


For repeat CT

## 2017-11-17 NOTE — CT
PROCEDURE:  CT Chest without contrast



HISTORY:

F/up CT ?? mass



COMPARISON:

Chest CT with contrast 10/30/2017.



TECHNIQUE:

Contiguous axial images were obtained through the chest without 

intravenous contrast enhancement. Sagittal and coronal 

reconstructions were performed.







Radiation dose (DLP): 281.27 mGy-cm. 



This CT exam was performed using one or more of the following dose 

reduction techniques: Automated exposure control, adjustment of the 

mA and/or kV according to patient size, and/or use of iterative 

reconstruction technique.



FINDINGS:



LUNGS:

A cavitary mass at the root right apex appears increased in size 

measuring 4.1 x 6.0 cm with increasing opacification and diminishing 

air-filled cavity identified. Subcentimeter satellite nodules are 

seen associated with this focus once again with additional nodular 

infiltrate infiltrates identified at the left apex and superior 

segments of the bilateral lower lobes, not significantly changed in 

the interval. A broad differential diagnosis remains very firm tumor 

two inflammatory causes and infection including granulomatous 

disease. Calcifications are associated with a few of these nodules 

particularly at the left lung raising the possibility of 

granulomatous disease however there is only limited mediastinal 

lymphadenopathy including a diminishing precarinal lymph node 

measuring 2.0 x 1.2 cm and a stable 2.0 x 0.9 cm subcarinal lymph 

node. 



MEDIASTINUM:

Unremarkable thoracic aorta. No aneurysm. Normal sized heart. Main 

pulmonary artery unremarkable. No vascular congestion. Lymph nodes 

described above.



PLEURA:

No pleural fluid. No pneumothorax.



BONES:

No fracture. No destructive lesion. 



UPPER ABDOMEN:

Cholelithiasis at is again identified within a contracted gallbladder,

 with a porcelain gallbladder a possibility.



OTHER FINDINGS:

None.



IMPRESSION:

Bilateral upper lobe and superior segment masses and reticular 

infiltrates are appreciated once again with the dominant lesion at 

the right apex increasing in size to 4.1 x 6.0 cm with diminishing 

cavitation. Occasional calcifications are space with left-sided 

nodules once again. Although this is not necessarily definitive for 

neoplasm, neoplasm is clearly not been excluded and may have 

increased in likelihood of other differential diagnostic 

possibilities including granulomatous disease or other infectious 

processes, or inflammatory processes. Further clinical correlation is 

advised.

## 2017-11-18 NOTE — CP.PCM.PN
Subjective





- Date & Time of Evaluation


Date of Evaluation: 11/18/17


Time of Evaluation: 02:09





- Subjective


Subjective: 





Pt complain and eager to go home.


(+) AFB 2+ positive again.


No CP, no SOB, no n/v, no diarrhea. Appetite is good


CT scan reviewed and has inc mass despite clinical improvement.


Also ?? why persistent (+) AFB





Objective





- Vital Signs/Intake and Output


Vital Signs (last 24 hours): 


 











Temp Pulse Resp BP Pulse Ox


 


 98.3 F   115 H  20   115/73   96 


 


 11/18/17 08:02  11/18/17 08:02  11/18/17 08:02  11/18/17 08:02  11/18/17 08:02








Intake and Output: 


 











 11/18/17 11/18/17





 06:59 18:59


 


Intake Total 1150 


 


Balance 1150 














- Medications


Medications: 


 Current Medications





Ethambutol HCl (Myambutol)  1,200 mg PO DAILY [facility]


   Last Admin: 11/18/17 09:14 Dose:  1,200 mg


Isoniazid (Niazid)  300 mg PO DAILY [facility]


   Last Admin: 11/18/17 09:14 Dose:  300 mg


Losartan Potassium (Cozaar)  50 mg PO DAILY [facility]


   Last Admin: 11/18/17 09:15 Dose:  50 mg


Montelukast Sodium (Singulair)  10 mg PO HS [facility]


   Last Admin: 11/17/17 21:22 Dose:  10 mg


Pyrazinamide (Pyrazinamide)  1,500 mg PO DAILY [facility]


   Last Admin: 11/18/17 09:14 Dose:  1,500 mg


Pyridoxine HCl (Vitamin B6 50 Mg Tab)  50 mg PO DAILY [facility]


   Last Admin: 11/18/17 09:15 Dose:  50 mg


Rifampin (Rifampin Cap)  600 mg PO DAILY [facility]


   Last Admin: 11/18/17 09:15 Dose:  600 mg


Fluticasone/Salmeterol (Advair Diskus 250/50)  1 puff INH RQ12 [facility]


   Last Admin: 11/17/17 19:46 Dose:  1 puff











- Labs


Labs: 


 





 11/14/17 07:26 





 11/14/17 07:26 





 











PT  13.8 SECONDS (9.7-12.2)  H  10/30/17  12:59    


 


INR  1.2   10/30/17  12:59    


 


APTT  38 SECONDS (21-34)  H  10/30/17  12:59    














- Constitutional


Appears: No Acute Distress





- Eye Exam


Eye Exam: Normal appearance





- ENT Exam


ENT Exam: Mucous Membranes Moist





- Neck Exam


Neck Exam: Full ROM.  absent: Lymphadenopathy, Meningismus





- Respiratory Exam


Respiratory Exam: Clear to Ausculation Bilateral.  absent: Rales, Rhonchi, 

Wheezes





- Cardiovascular Exam


Cardiovascular Exam: +S1, +S2, Murmur.  absent: Gallop, REGULAR RHYTHM





- GI/Abdominal Exam


GI & Abdominal Exam: Soft.  absent: Tenderness, Mass





- Extremities Exam


Extremities Exam: Calf Tenderness, Normal Capillary Refill.  absent: Full ROM, 

Joint Swelling, Pedal Edema





Assessment and Plan





- Assessment and Plan (Free Text)


Assessment: 





Tachycardia ? etio


PTB, HtN w inc size of mass





Pulmonary note reviewed and appreciated


Continue meds

## 2017-11-18 NOTE — CP.PCM.PN
Subjective





- Date & Time of Evaluation


Date of Evaluation: 11/18/17


Time of Evaluation: 09:20





- Subjective


Subjective: 





patient seen and examined


lying comfortably in no acute distress


Denies cough, denies hemoptysis, denies fever chills


Clinically improving


Good appetite


CAT scan of the chest consistent with increase size off right upper lung mass 

with decreased cavitation


Continue antituberculosis medicine


Sputum AFB





Objective





- Vital Signs/Intake and Output


Vital Signs (last 24 hours): 


 











Temp Pulse Resp BP Pulse Ox


 


 98.3 F   115 H  20   115/73   96 


 


 11/18/17 08:02  11/18/17 08:02  11/18/17 08:02  11/18/17 08:02  11/18/17 08:02








Intake and Output: 


 











 11/18/17 11/18/17





 06:59 18:59


 


Intake Total 1150 


 


Balance 1150 














- Medications


Medications: 


 Current Medications





Ethambutol HCl (Myambutol)  1,200 mg PO DAILY [facility]


   Last Admin: 11/18/17 09:14 Dose:  1,200 mg


Isoniazid (Niazid)  300 mg PO DAILY [facility]


   Last Admin: 11/18/17 09:14 Dose:  300 mg


Losartan Potassium (Cozaar)  50 mg PO DAILY [facility]


   Last Admin: 11/18/17 09:15 Dose:  50 mg


Montelukast Sodium (Singulair)  10 mg PO HS [facility]


   Last Admin: 11/17/17 21:22 Dose:  10 mg


Pyrazinamide (Pyrazinamide)  1,500 mg PO DAILY [facility]


   Last Admin: 11/18/17 09:14 Dose:  1,500 mg


Pyridoxine HCl (Vitamin B6 50 Mg Tab)  50 mg PO DAILY [facility]


   Last Admin: 11/18/17 09:15 Dose:  50 mg


Rifampin (Rifampin Cap)  600 mg PO DAILY [facility]


   Last Admin: 11/18/17 09:15 Dose:  600 mg


Fluticasone/Salmeterol (Advair Diskus 250/50)  1 puff INH RQ12 [facility]


   Last Admin: 11/17/17 19:46 Dose:  1 puff











- Labs


Labs: 


 





 11/14/17 07:26 





 11/14/17 07:26 





 











PT  13.8 SECONDS (9.7-12.2)  H  10/30/17  12:59    


 


INR  1.2   10/30/17  12:59    


 


APTT  38 SECONDS (21-34)  H  10/30/17  12:59    














Assessment and Plan


(1) Tuberculosis


Status: Acute   





(2) Hemoptysis


Status: Acute

## 2017-11-19 LAB
ALBUMIN/GLOB SERPL: 1.1 {RATIO} (ref 1–2.1)
ALP SERPL-CCNC: 125 U/L (ref 38–126)
ALT SERPL-CCNC: 45 U/L (ref 21–72)
AST SERPL-CCNC: 37 U/L (ref 17–59)
BILIRUB SERPL-MCNC: 0.6 MG/DL (ref 0.2–1.3)
BUN SERPL-MCNC: 14 MG/DL (ref 9–20)
CALCIUM SERPL-MCNC: 8.6 MG/DL (ref 8.6–10.4)
CHLORIDE SERPL-SCNC: 101 MMOL/L (ref 98–107)
CO2 SERPL-SCNC: 22 MMOL/L (ref 22–30)
ERYTHROCYTE [DISTWIDTH] IN BLOOD BY AUTOMATED COUNT: 14.9 % (ref 11.5–14.5)
GLOBULIN SER-MCNC: 3.6 GM/DL (ref 2.2–3.9)
GLUCOSE SERPL-MCNC: 108 MG/DL (ref 75–110)
HCT VFR BLD CALC: 38.9 % (ref 35–51)
MCH RBC QN AUTO: 28.7 PG (ref 27–31)
MCHC RBC AUTO-ENTMCNC: 32.9 G/DL (ref 33–37)
MCV RBC AUTO: 87.4 FL (ref 80–94)
PLATELET # BLD: 375 K/UL (ref 130–400)
PMV BLD AUTO: 6.8 FL (ref 7.2–11.7)
POTASSIUM SERPL-SCNC: 4 MMOL/L (ref 3.6–5.2)
PROT SERPL-MCNC: 7.7 G/DL (ref 6.3–8.3)
SODIUM SERPL-SCNC: 134 MMOL/L (ref 132–148)
WBC # BLD AUTO: 9 K/UL (ref 4.8–10.8)

## 2017-11-19 RX ADMIN — FLUTICASONE PROPIONATE AND SALMETEROL SCH PUFF: 50; 250 POWDER RESPIRATORY (INHALATION) at 20:36

## 2017-11-19 RX ADMIN — FLUTICASONE PROPIONATE AND SALMETEROL SCH PUFF: 50; 250 POWDER RESPIRATORY (INHALATION) at 07:31

## 2017-11-20 RX ADMIN — FLUTICASONE PROPIONATE AND SALMETEROL SCH: 50; 250 POWDER RESPIRATORY (INHALATION) at 19:52

## 2017-11-20 RX ADMIN — FLUTICASONE PROPIONATE AND SALMETEROL SCH PUFF: 50; 250 POWDER RESPIRATORY (INHALATION) at 07:44

## 2017-11-20 NOTE — CP.PCM.PN
Subjective





- Date & Time of Evaluation


Date of Evaluation: 11/20/17


Time of Evaluation: 08:20





- Subjective


Subjective: 





Pt no complain; no CP, no SOB, no palpitation, no edema.


NO n/v, no diarrhea, o urinaty complain


? right upper lobe mass





Objective





- Vital Signs/Intake and Output


Vital Signs (last 24 hours): 


 











Temp Pulse Resp BP Pulse Ox


 


 97.9 F   93 H  20   119/82   96 


 


 11/20/17 07:50  11/20/17 07:50  11/20/17 07:50  11/20/17 07:50  11/20/17 07:50








Intake and Output: 


 











 11/20/17 11/20/17





 06:59 18:59


 


Intake Total  150


 


Balance  150














- Medications


Medications: 


 Current Medications





Ethambutol HCl (Myambutol)  1,200 mg PO DAILY [facility]


   Last Admin: 11/19/17 09:03 Dose:  1,200 mg


Isoniazid (Niazid)  300 mg PO DAILY [facility]


   Last Admin: 11/19/17 09:04 Dose:  300 mg


Losartan Potassium (Cozaar)  50 mg PO DAILY [facility]


   Last Admin: 11/19/17 09:04 Dose:  50 mg


Montelukast Sodium (Singulair)  10 mg PO HS [facility]


   Last Admin: 11/19/17 21:23 Dose:  10 mg


Pyrazinamide (Pyrazinamide)  1,500 mg PO DAILY [facility]


   Last Admin: 11/19/17 09:04 Dose:  1,500 mg


Pyridoxine HCl (Vitamin B6 50 Mg Tab)  50 mg PO DAILY [facility]


   Last Admin: 11/19/17 09:04 Dose:  50 mg


Rifampin (Rifampin Cap)  600 mg PO DAILY [facility]


   Last Admin: 11/19/17 09:04 Dose:  600 mg


Fluticasone/Salmeterol (Advair Diskus 250/50)  1 puff INH RQ12 [facility]


   Last Admin: 11/20/17 07:44 Dose:  1 puff











- Labs


Labs: 


 





 11/19/17 07:29 





 11/19/17 07:29 





 











PT  13.8 SECONDS (9.7-12.2)  H  10/30/17  12:59    


 


INR  1.2   10/30/17  12:59    


 


APTT  38 SECONDS (21-34)  H  10/30/17  12:59    














- Head Exam


Head Exam: NORMAL INSPECTION





- ENT Exam


ENT Exam: Mucous Membranes Moist





- Neck Exam


Neck Exam: Full ROM.  absent: Lymphadenopathy, Tenderness





- Respiratory Exam


Respiratory Exam: Decreased Breath Sounds.  absent: Rales, Rhonchi, Wheezes





- Cardiovascular Exam


Cardiovascular Exam: REGULAR RHYTHM, +S1, +S2.  absent: Gallop, JVD, Murmur





- GI/Abdominal Exam


GI & Abdominal Exam: Soft.  absent: Tenderness, Mass





- Extremities Exam


Extremities Exam: Full ROM, Normal Capillary Refill.  absent: Calf Tenderness, 

Joint Swelling, Pedal Edema





Assessment and Plan





- Assessment and Plan (Free Text)


Assessment: 





Pulm mass; PTB


HTN





Fo repeat AFB x 2 befoe isolation


Cont meds

## 2017-11-21 RX ADMIN — FLUTICASONE PROPIONATE AND SALMETEROL SCH PUFF: 50; 250 POWDER RESPIRATORY (INHALATION) at 20:55

## 2017-11-21 RX ADMIN — FLUTICASONE PROPIONATE AND SALMETEROL SCH PUFF: 50; 250 POWDER RESPIRATORY (INHALATION) at 07:59

## 2017-11-21 NOTE — CP.PCM.PN
Subjective





- Date & Time of Evaluation


Date of Evaluation: 11/21/17


Time of Evaluation: 08:37





- Subjective


Subjective: 





Pt feels well; cough is loose if any; very minimal mucus


No CP, no SOB, no n/v,no diarrhea





Objective





- Vital Signs/Intake and Output


Vital Signs (last 24 hours): 


 











Temp Pulse Resp BP Pulse Ox


 


 98.3 F   94 H  20   120/82   100 


 


 11/21/17 08:30  11/21/17 08:30  11/21/17 08:30  11/21/17 08:30  11/21/17 08:30








Intake and Output: 


 











 11/21/17 11/21/17





 06:59 18:59


 


Intake Total 1000 


 


Balance 1000 














- Medications


Medications: 


 Current Medications





Ethambutol HCl (Myambutol)  1,[facility]


   Last Admin: 11/20/17 09:41 Dose:  1,200 mg


Isoniazid (Niazid)  300 mg PO [facility]ILY [facility]


   Last Admin: 11/20/17 09:41 Dose:  300 mg


Losartan Potassium (Cozaar)  50 mg PO [facility]ILY [facility]


   Last Admin: 11/20/17 09:41 Dose:  50 mg


Montelukast Sodium (Singulair)  10 mg PO HS [facility]


   Last Admin: 11/20/17 21:20 Dose:  10 mg


Pyrazinamide (Pyrazinamide)  1,500 mg PO [facility]ILY [facility]


   Last Admin: 11/20/17 09:41 Dose:  1,500 mg


Pyridoxine HCl (Vitamin B6 50 Mg Tab)  50 mg PO DAILY [facility]


   Last Admin: 11/20/17 09:41 Dose:  50 mg


Rifampin (Rifampin Cap)  600 mg PO [facility]ILY [facility]


   Last Admin: 11/20/17 09:41 Dose:  600 mg


Fluticasone/Salmeterol (Advair Diskus 250/50)  1 puff INH RQ12 [facility]


   Last Admin: 11/21/17 07:59 Dose:  1 puff











- Labs


Labs: 


 





 11/19/17 07:29 





 11/19/17 07:29 





 











PT  13.8 SECONDS (9.7-12.2)  H  10/30/17  12:59    


 


INR  1.2   10/30/17  12:59    


 


APTT  38 SECONDS (21-34)  H  10/30/17  12:59    














- Head Exam


Head Exam: NORMAL INSPECTION





- Eye Exam


Eye Exam: Normal appearance





- ENT Exam


ENT Exam: Mucous Membranes Moist





- Neck Exam


Neck Exam: Full ROM.  absent: Lymphadenopathy, Normal Inspection





- Respiratory Exam


Respiratory Exam: Clear to Ausculation Bilateral.  absent: Rales, Rhonchi, 

Wheezes





- Cardiovascular Exam


Cardiovascular Exam: REGULAR RHYTHM, +S1, +S2, Murmur.  absent: Gallop, JVD, 

Rubs





- GI/Abdominal Exam


GI & Abdominal Exam: Soft.  absent: Tenderness, Mass





- Extremities Exam


Extremities Exam: Full ROM, Normal Capillary Refill.  absent: Calf Tenderness, 

Joint Swelling, Pedal Edema





Assessment and Plan





- Assessment and Plan (Free Text)


Assessment: 





Pulm mass


Pneumonia; PTB - clinically improve


HTN





Cont meds


Await pulmonary opinion - spoke w/ RN

## 2017-11-21 NOTE — CP.PCM.PN
Subjective





- Date & Time of Evaluation


Date of Evaluation: 11/21/17


Time of Evaluation: 11:00





- Subjective


Subjective: 





Patient seen and examined at bedside. Sitting up, NAD. 


Patient says he is feeling well. Reports his appetite is good. Says he has a 

cough with clear mucous occasionally in the morning. Denies any fever, chills, 

headache, n/v, abdominal pain, numbness or tingling. 








Objective





- Vital Signs/Intake and Output


Vital Signs (last 24 hours): 


 











Temp Pulse Resp BP Pulse Ox


 


 97.8 F   88   20   118/77   98 


 


 11/21/17 15:45  11/21/17 15:45  11/21/17 15:45  11/21/17 15:45  11/21/17 15:45








Intake and Output: 


 











 11/21/17 11/21/17





 06:59 18:59


 


Intake Total 1000 480


 


Balance 1000 480














- Medications


Medications: 


 Current Medications





Ethambutol HCl (Myambutol)  1,200 mg PO DAILY [facility]


   Last Admin: 11/21/17 10:15 Dose:  1,200 mg


Isoniazid (Niazid)  300 mg PO DAILY [facility]


   Last Admin: 11/21/17 10:15 Dose:  300 mg


Losartan Potassium (Cozaar)  50 mg PO DAILY [facility]


   Last Admin: 11/21/17 10:16 Dose:  50 mg


Montelukast Sodium (Singulair)  10 mg PO HS [facility]


   Last Admin: 11/20/17 21:20 Dose:  10 mg


Pyrazinamide (Pyrazinamide)  1,500 mg PO DAILY [facility]


   Last Admin: 11/21/17 10:16 Dose:  1,500 mg


Pyridoxine HCl (Vitamin B6 50 Mg Tab)  50 mg PO DAILY [facility]


   Last Admin: 11/21/17 10:16 Dose:  50 mg


Rifampin (Rifampin Cap)  600 mg PO DAILY [facility]


   Last Admin: 11/21/17 10:15 Dose:  600 mg


Fluticasone/Salmeterol (Advair Diskus 250/50)  1 puff INH RQ12 [facility]


   Last Admin: 11/21/17 07:59 Dose:  1 puff











- Labs


Labs: 


 





 11/19/17 07:29 





 11/19/17 07:29 





 











PT  13.8 SECONDS (9.7-12.2)  H  10/30/17  12:59    


 


INR  1.2   10/30/17  12:59    


 


APTT  38 SECONDS (21-34)  H  10/30/17  12:59    














- Head Exam


Head Exam: ATRAUMATIC, NORMOCEPHALIC





- Eye Exam


Eye Exam: Normal appearance





- ENT Exam


ENT Exam: Mucous Membranes Moist





- Neck Exam


Neck Exam: Normal Inspection





- Respiratory Exam


Respiratory Exam: Clear to Ausculation Bilateral





- Cardiovascular Exam


Cardiovascular Exam: REGULAR RHYTHM





- GI/Abdominal Exam


GI & Abdominal Exam: Soft





Assessment and Plan


(1) Tuberculosis


Assessment & Plan: 


A/P Patient is a 61 y/o M with a PMHx of HTN who presents with tuberculosis 


Tuberculosis - 


Continue anti-TB therapy


Bronchoscopy once AFP sputum is negative 


Status: Acute   





(2) Hemoptysis


Status: Acute

## 2017-11-22 RX ADMIN — FLUTICASONE PROPIONATE AND SALMETEROL SCH PUFF: 50; 250 POWDER RESPIRATORY (INHALATION) at 20:17

## 2017-11-22 RX ADMIN — FLUTICASONE PROPIONATE AND SALMETEROL SCH PUFF: 50; 250 POWDER RESPIRATORY (INHALATION) at 07:37

## 2017-11-22 NOTE — CP.PCM.PN
Subjective





- Date & Time of Evaluation


Date of Evaluation: 11/22/17


Time of Evaluation: 08:30





- Subjective


Subjective: 





pt no complain. no CO, no SOB, no n/v, good appetite


Seen c/o pulm but still (+) AFB 2+





Objective





- Vital Signs/Intake and Output


Vital Signs (last 24 hours): 


 











Temp Pulse Resp BP Pulse Ox


 


 98.3 F   109 H  20   106/73   95 


 


 11/22/17 07:56  11/22/17 07:56  11/22/17 07:56  11/22/17 07:56  11/22/17 07:56








Intake and Output: 


 











 11/22/17 11/22/17





 06:59 18:59


 


Intake Total 550 


 


Balance 550 














- Medications


Medications: 


 Current Medications





Ethambutol HCl (Myambutol)  1,200 mg PO DAILY [facility]


   Last Admin: 11/21/17 10:15 Dose:  1,200 mg


Isoniazid (Niazid)  300 mg PO DAILY [facility]


   Last Admin: 11/21/17 10:15 Dose:  300 mg


Losartan Potassium (Cozaar)  50 mg PO DAILY [facility]


   Last Admin: 11/21/17 10:16 Dose:  50 mg


Montelukast Sodium (Singulair)  10 mg PO HS [facility]


   Last Admin: 11/21/17 21:23 Dose:  10 mg


Pyrazinamide (Pyrazinamide)  1,500 mg PO DAILY [facility]


   Last Admin: 11/21/17 10:16 Dose:  1,500 mg


Pyridoxine HCl (Vitamin B6 50 Mg Tab)  50 mg PO DAILY [facility]


   Last Admin: 11/21/17 10:16 Dose:  50 mg


Rifampin (Rifampin Cap)  600 mg PO DAILY [facility]


   Last Admin: 11/21/17 10:15 Dose:  600 mg


Fluticasone/Salmeterol (Advair Diskus 250/50)  1 puff INH RQ12 [facility]


   Last Admin: 11/22/17 07:37 Dose:  1 puff











- Labs


Labs: 


 





 11/19/17 07:29 





 11/19/17 07:29 





 











PT  13.8 SECONDS (9.7-12.2)  H  10/30/17  12:59    


 


INR  1.2   10/30/17  12:59    


 


APTT  38 SECONDS (21-34)  H  10/30/17  12:59    














- Constitutional


Appears: No Acute Distress





- Eye Exam


Eye Exam: Normal appearance





- ENT Exam


ENT Exam: Mucous Membranes Moist





- Respiratory Exam


Respiratory Exam: Decreased Breath Sounds.  absent: Rales, Rhonchi, Wheezes





- Cardiovascular Exam


Cardiovascular Exam: REGULAR RHYTHM, +S1, +S2.  absent: Gallop, JVD, Murmur





- GI/Abdominal Exam


GI & Abdominal Exam: Soft.  absent: Tenderness, Mass





- Extremities Exam


Extremities Exam: Full ROM.  absent: Calf Tenderness, Joint Swelling, Normal 

Capillary Refill, Pedal Edema





Assessment and Plan





- Assessment and Plan (Free Text)


Assessment: 





Lung mass; PTB w/ persistent AFB


HTN





Cont meds/ supportive care

## 2017-11-23 RX ADMIN — FLUTICASONE PROPIONATE AND SALMETEROL SCH PUFF: 50; 250 POWDER RESPIRATORY (INHALATION) at 08:05

## 2017-11-23 NOTE — CP.PCM.PN
Subjective





- Date & Time of Evaluation


Date of Evaluation: 11/23/17


Time of Evaluation: 08:25





- Subjective


Subjective: 





Pt no complain; Walks around w/ mask


No CP, no SOB, nO edema, (+) mostly dry cough








Objective





- Vital Signs/Intake and Output


Vital Signs (last 24 hours): 


 











Temp Pulse Resp BP Pulse Ox


 


 97.9 F   82   20   102/73   96 


 


 11/22/17 23:12  11/22/17 23:12  11/22/17 23:12  11/22/17 23:12  11/22/17 23:12








Intake and Output: 


 











 11/23/17 11/23/17





 06:59 18:59


 


Intake Total 700 


 


Balance 700 














- Medications


Medications: 


 Current Medications





Ethambutol HCl (Myambutol)  1,200 mg PO DAILY [facility]


   Last Admin: 11/22/17 09:25 Dose:  1,200 mg


Isoniazid (Niazid)  300 mg PO DAILY [facility]


   Last Admin: 11/22/17 09:25 Dose:  300 mg


Losartan Potassium (Cozaar)  50 mg PO DAILY [facility]


   Last Admin: 11/22/17 09:25 Dose:  50 mg


Montelukast Sodium (Singulair)  10 mg PO HS [facility]


   Last Admin: 11/22/17 21:07 Dose:  10 mg


Pyrazinamide (Pyrazinamide)  1,500 mg PO DAILY [facility]


   Last Admin: 11/22/17 09:25 Dose:  1,500 mg


Pyridoxine HCl (Vitamin B6 50 Mg Tab)  50 mg PO DAILY [facility]


   Last Admin: 11/22/17 09:25 Dose:  50 mg


Rifampin (Rifampin Cap)  600 mg PO DAILY [facility]


   Last Admin: 11/22/17 09:25 Dose:  600 mg


Fluticasone/Salmeterol (Advair Diskus 250/50)  1 puff INH RQ12 [facility]


   Last Admin: 11/23/17 08:05 Dose:  1 puff











- Labs


Labs: 


 





 11/19/17 07:29 





 11/19/17 07:29 





 











PT  13.8 SECONDS (9.7-12.2)  H  10/30/17  12:59    


 


INR  1.2   10/30/17  12:59    


 


APTT  38 SECONDS (21-34)  H  10/30/17  12:59    














- Constitutional


Appears: No Acute Distress





- Eye Exam


Eye Exam: Normal appearance





- ENT Exam


ENT Exam: Mucous Membranes Moist





- Neck Exam


Neck Exam: Full ROM.  absent: Lymphadenopathy, Thyromegaly





- Respiratory Exam


Respiratory Exam: Clear to Ausculation Bilateral, Wheezes.  absent: Rales, 

Rhonchi





- Cardiovascular Exam


Cardiovascular Exam: REGULAR RHYTHM, +S1, +S2.  absent: Gallop, JVD, Murmur





- GI/Abdominal Exam


GI & Abdominal Exam: Soft.  absent: Mass





- Extremities Exam


Extremities Exam: Full ROM, Normal Capillary Refill.  absent: Calf Tenderness, 

Joint Swelling, Pedal Edema





Assessment and Plan





- Assessment and Plan (Free Text)


Assessment: 





Lung mass; PTB


HTN





cont meds


AFB neg x 1

## 2017-11-24 RX ADMIN — FLUTICASONE PROPIONATE AND SALMETEROL SCH PUFF: 50; 250 POWDER RESPIRATORY (INHALATION) at 20:06

## 2017-11-24 RX ADMIN — FLUTICASONE PROPIONATE AND SALMETEROL SCH PUFF: 50; 250 POWDER RESPIRATORY (INHALATION) at 07:50

## 2017-11-24 NOTE — CP.PCM.PN
Subjective





- Date & Time of Evaluation


Date of Evaluation: 11/24/17


Time of Evaluation: 08:24





- Subjective


Subjective: 





Pt no complain; walking w/ mask on


No CP, no SOB, no edema, no p[alpitation


No n/v nor diarrhea





Objective





- Vital Signs/Intake and Output


Vital Signs (last 24 hours): 


 











Temp Pulse Resp BP Pulse Ox


 


 98.1 F   97 H  18   113/75   97 


 


 11/24/17 07:20  11/24/17 07:20  11/24/17 07:20  11/24/17 07:20  11/24/17 07:20








Intake and Output: 


 











 11/24/17 11/24/17





 06:59 18:59


 


Intake Total 840 


 


Balance 840 














- Medications


Medications: 


 Current Medications





Ethambutol HCl (Myambutol)  1,200 mg PO DAILY [facility]


   Last Admin: 11/24/17 09:02 Dose:  1,200 mg


Isoniazid (Niazid)  300 mg PO DAILY [facility]


   Last Admin: 11/24/17 09:02 Dose:  300 mg


Losartan Potassium (Cozaar)  50 mg PO DAILY [facility]


   Last Admin: 11/24/17 09:02 Dose:  50 mg


Montelukast Sodium (Singulair)  10 mg PO HS [facility]


   Last Admin: 11/23/17 21:27 Dose:  10 mg


Pyrazinamide (Pyrazinamide)  1,500 mg PO DAILY [facility]


   Last Admin: 11/24/17 09:03 Dose:  1,500 mg


Pyridoxine HCl (Vitamin B6 50 Mg Tab)  50 mg PO DAILY [facility]


   Last Admin: 11/24/17 09:03 Dose:  50 mg


Rifampin (Rifampin Cap)  600 mg PO DAILY [facility]


   Last Admin: 11/24/17 09:02 Dose:  600 mg


Fluticasone/Salmeterol (Advair Diskus 250/50)  1 puff INH RQ12 [facility]


   Last Admin: 11/24/17 07:50 Dose:  1 puff











- Labs


Labs: 


 





 11/19/17 07:29 





 11/19/17 07:29 





 











PT  13.8 SECONDS (9.7-12.2)  H  10/30/17  12:59    


 


INR  1.2   10/30/17  12:59    


 


APTT  38 SECONDS (21-34)  H  10/30/17  12:59    














- Constitutional


Appears: No Acute Distress





- Eye Exam


Eye Exam: Normal appearance





- ENT Exam


ENT Exam: Mucous Membranes Moist





- Neck Exam


Neck Exam: Full ROM.  absent: Lymphadenopathy, Thyromegaly





- Respiratory Exam


Respiratory Exam: Clear to Ausculation Bilateral.  absent: Rales, Rhonchi, 

Wheezes





- GI/Abdominal Exam


GI & Abdominal Exam: Soft.  absent: Tenderness, Mass





- Extremities Exam


Extremities Exam: Full ROM, Normal Capillary Refill.  absent: Calf Tenderness, 

Joint Swelling, Pedal Edema





Assessment and Plan





- Assessment and Plan (Free Text)


Assessment: 





Lung mass; PTB, HTN





Await 3 neg AFB


For Bronchoscopy


Cont meds

## 2017-11-25 RX ADMIN — FLUTICASONE PROPIONATE AND SALMETEROL SCH PUFF: 50; 250 POWDER RESPIRATORY (INHALATION) at 08:06

## 2017-11-25 RX ADMIN — FLUTICASONE PROPIONATE AND SALMETEROL SCH: 50; 250 POWDER RESPIRATORY (INHALATION) at 19:37

## 2017-11-25 RX ADMIN — FLUTICASONE PROPIONATE AND SALMETEROL SCH PUFF: 50; 250 POWDER RESPIRATORY (INHALATION) at 08:05

## 2017-11-26 RX ADMIN — FLUTICASONE PROPIONATE AND SALMETEROL SCH: 50; 250 POWDER RESPIRATORY (INHALATION) at 19:20

## 2017-11-26 RX ADMIN — FLUTICASONE PROPIONATE AND SALMETEROL SCH PUFF: 50; 250 POWDER RESPIRATORY (INHALATION) at 08:00

## 2017-11-27 RX ADMIN — FLUTICASONE PROPIONATE AND SALMETEROL SCH PUFF: 50; 250 POWDER RESPIRATORY (INHALATION) at 08:10

## 2017-11-27 RX ADMIN — FLUTICASONE PROPIONATE AND SALMETEROL SCH PUFF: 50; 250 POWDER RESPIRATORY (INHALATION) at 20:04

## 2017-11-27 NOTE — CP.PCM.PN
Subjective





- Date & Time of Evaluation


Date of Evaluation: 11/27/17


Time of Evaluation: 08:45





- Subjective


Subjective: 





Pt no complain; walkjing w/ mask


AFB still (+); but nash dec cough, no CP, no SOB, no edema, 


No n/v, no diarrhea, no dysuria, no freq/ nocturia





Objective





- Vital Signs/Intake and Output


Vital Signs (last 24 hours): 


 











Temp Pulse Resp BP Pulse Ox


 


 98.4 F   99 H  20   104/74   96 


 


 11/27/17 08:29  11/27/17 08:29  11/27/17 08:29  11/27/17 08:29  11/27/17 08:29








Intake and Output: 


 











 11/27/17 11/27/17





 06:59 18:59


 


Intake Total 400 450


 


Balance 400 450














- Medications


Medications: 


 Current Medications





Ethambutol HCl (Myambutol)  1,200 mg PO DAILY [facility]


   Last Admin: 11/26/17 09:34 Dose:  1,200 mg


Isoniazid (Niazid)  300 mg PO DAILY [facility]


   Last Admin: 11/26/17 09:34 Dose:  300 mg


Losartan Potassium (Cozaar)  50 mg PO DAILY [facility]


   Last Admin: 11/26/17 09:34 Dose:  50 mg


Montelukast Sodium (Singulair)  10 mg PO HS [facility]


   Last Admin: 11/26/17 21:30 Dose:  10 mg


Pyrazinamide (Pyrazinamide)  1,500 mg PO DAILY [facility]


   Last Admin: 11/26/17 09:34 Dose:  1,500 mg


Pyridoxine HCl (Vitamin B6 50 Mg Tab)  50 mg PO DAILY [facility]


   Last Admin: 11/26/17 09:34 Dose:  50 mg


Rifampin (Rifampin Cap)  600 mg PO DAILY [facility]


   Last Admin: 11/26/17 09:34 Dose:  600 mg


Fluticasone/Salmeterol (Advair Diskus 250/50)  1 puff INH RQ12 [facility]


   Last Admin: 11/27/17 08:10 Dose:  1 puff











- Labs


Labs: 


 





 11/19/17 07:29 





 11/19/17 07:29 





 











PT  13.8 SECONDS (9.7-12.2)  H  10/30/17  12:59    


 


INR  1.2   10/30/17  12:59    


 


APTT  38 SECONDS (21-34)  H  10/30/17  12:59    














- Constitutional


Appears: No Acute Distress





- Eye Exam


Eye Exam: Normal appearance





- ENT Exam


ENT Exam: Mucous Membranes Moist





- Neck Exam


Neck Exam: Full ROM.  absent: Lymphadenopathy, Thyromegaly





- Respiratory Exam


Respiratory Exam: Clear to Ausculation Bilateral.  absent: Rales, Rhonchi, 

Wheezes





- Cardiovascular Exam


Cardiovascular Exam: REGULAR RHYTHM, +S1, +S2, Murmur.  absent: Gallop, JVD





- GI/Abdominal Exam


GI & Abdominal Exam: Soft.  absent: Tenderness, Mass





- Extremities Exam


Extremities Exam: Full ROM, Normal Capillary Refill.  absent: Calf Tenderness, 

Joint Swelling, Pedal Edema





Assessment and Plan





- Assessment and Plan (Free Text)


Assessment: 





Lung mass; HTN


Pneumonia w/ PTB





Cont meds


Repeat AFB

## 2017-11-27 NOTE — CP.PCM.PN
Subjective





- Date & Time of Evaluation


Date of Evaluation: 11/27/17


Time of Evaluation: 11:00





- Subjective


Subjective: 





Patient seen and examined


Denies hemoptysis, denies cough, denies fever chills


AFB positive


Remained on respiratory isolation


Seen by infectious disease


awaiting for culture and sensitivity


Follow-up CAT scan of chest





Objective





- Vital Signs/Intake and Output


Vital Signs (last 24 hours): 


 











Temp Pulse Resp BP Pulse Ox


 


 98.4 F   99 H  20   104/74   96 


 


 11/27/17 08:29  11/27/17 08:29  11/27/17 08:29  11/27/17 08:29  11/27/17 08:29








Intake and Output: 


 











 11/27/17 11/27/17





 06:59 18:59


 


Intake Total 400 450


 


Balance 400 450














- Medications


Medications: 


 Current Medications





Ethambutol HCl (Myambutol)  1,200 mg PO DAILY [facility]


   Last Admin: 11/27/17 09:49 Dose:  1,200 mg


Isoniazid (Niazid)  300 mg PO DAILY [facility]


   Last Admin: 11/27/17 09:49 Dose:  300 mg


Losartan Potassium (Cozaar)  50 mg PO DAILY [facility]


   Last Admin: 11/27/17 09:49 Dose:  50 mg


Montelukast Sodium (Singulair)  10 mg PO HS [facility]


   Last Admin: 11/26/17 21:30 Dose:  10 mg


Pyrazinamide (Pyrazinamide)  1,500 mg PO DAILY [facility]


   Last Admin: 11/27/17 09:49 Dose:  1,500 mg


Pyridoxine HCl (Vitamin B6 50 Mg Tab)  50 mg PO DAILY [facility]


   Last Admin: 11/27/17 09:49 Dose:  50 mg


Rifampin (Rifampin Cap)  600 mg PO DAILY [facility]


   Last Admin: 11/27/17 09:49 Dose:  600 mg


Fluticasone/Salmeterol (Advair Diskus 250/50)  1 puff INH RQ12 [facility]


   Last Admin: 11/27/17 08:10 Dose:  1 puff











- Labs


Labs: 


 





 11/19/17 07:29 





 11/19/17 07:29 





 











PT  13.8 SECONDS (9.7-12.2)  H  10/30/17  12:59    


 


INR  1.2   10/30/17  12:59    


 


APTT  38 SECONDS (21-34)  H  10/30/17  12:59    














Assessment and Plan


(1) Tuberculosis


Status: Acute   





(2) Hemoptysis


Status: Acute

## 2017-11-28 LAB
ALBUMIN/GLOB SERPL: 1.1 {RATIO} (ref 1–2.1)
ALP SERPL-CCNC: 99 U/L (ref 38–126)
ALT SERPL-CCNC: 32 U/L (ref 21–72)
AST SERPL-CCNC: 27 U/L (ref 17–59)
BASOPHILS # BLD AUTO: 0.1 K/UL (ref 0–0.2)
BASOPHILS NFR BLD: 0.9 % (ref 0–2)
BILIRUB SERPL-MCNC: 0.6 MG/DL (ref 0.2–1.3)
BUN SERPL-MCNC: 14 MG/DL (ref 9–20)
CALCIUM SERPL-MCNC: 8.6 MG/DL (ref 8.6–10.4)
CHLORIDE SERPL-SCNC: 98 MMOL/L (ref 98–107)
CO2 SERPL-SCNC: 25 MMOL/L (ref 22–30)
EOSINOPHIL # BLD AUTO: 0.3 K/UL (ref 0–0.7)
EOSINOPHIL NFR BLD: 3.9 % (ref 0–4)
ERYTHROCYTE [DISTWIDTH] IN BLOOD BY AUTOMATED COUNT: 14.7 % (ref 11.5–14.5)
GLOBULIN SER-MCNC: 3.6 GM/DL (ref 2.2–3.9)
GLUCOSE SERPL-MCNC: 128 MG/DL (ref 75–110)
HCT VFR BLD CALC: 37.7 % (ref 35–51)
LYMPHOCYTES # BLD AUTO: 1.1 K/UL (ref 1–4.3)
LYMPHOCYTES NFR BLD AUTO: 13.4 % (ref 20–40)
MCH RBC QN AUTO: 29.4 PG (ref 27–31)
MCHC RBC AUTO-ENTMCNC: 33.6 G/DL (ref 33–37)
MCV RBC AUTO: 87.5 FL (ref 80–94)
MONOCYTES # BLD: 0.9 K/UL (ref 0–0.8)
MONOCYTES NFR BLD: 11.2 % (ref 0–10)
NRBC BLD AUTO-RTO: 0 % (ref 0–2)
PLATELET # BLD: 285 K/UL (ref 130–400)
PMV BLD AUTO: 7 FL (ref 7.2–11.7)
POTASSIUM SERPL-SCNC: 3.9 MMOL/L (ref 3.6–5.2)
PROT SERPL-MCNC: 7.6 G/DL (ref 6.3–8.3)
SODIUM SERPL-SCNC: 134 MMOL/L (ref 132–148)
WBC # BLD AUTO: 7.9 K/UL (ref 4.8–10.8)

## 2017-11-28 RX ADMIN — FLUTICASONE PROPIONATE AND SALMETEROL SCH PUFF: 50; 250 POWDER RESPIRATORY (INHALATION) at 20:13

## 2017-11-28 RX ADMIN — FLUTICASONE PROPIONATE AND SALMETEROL SCH PUFF: 50; 250 POWDER RESPIRATORY (INHALATION) at 07:32

## 2017-11-28 NOTE — CP.PCM.PN
Subjective





- Date & Time of Evaluation


Date of Evaluation: 11/28/17


Time of Evaluation: 09:15





- Subjective


Subjective: 





Pt no complain; walks around


cough is mostly dry; No CP, no SOB, no edema; Appetite is better





Objective





- Vital Signs/Intake and Output


Vital Signs (last 24 hours): 


 











Temp Pulse Resp BP Pulse Ox


 


 97.9 F   93 H  20   133/86   96 


 


 11/28/17 07:00  11/28/17 07:00  11/28/17 07:00  11/28/17 07:00  11/28/17 07:00











- Medications


Medications: 


 Current Medications





Ethambutol HCl (Myambutol)  1,200 mg PO DAILY [facility]


   Last Admin: 11/27/17 09:49 Dose:  1,200 mg


Isoniazid (Niazid)  300 mg PO DAILY [facility]


   Last Admin: 11/27/17 09:49 Dose:  300 mg


Losartan Potassium (Cozaar)  50 mg PO DAILY [facility]


   Last Admin: 11/27/17 09:49 Dose:  50 mg


Montelukast Sodium (Singulair)  10 mg PO HS [facility]


   Last Admin: 11/27/17 22:09 Dose:  10 mg


Pyrazinamide (Pyrazinamide)  1,500 mg PO DAILY [facility]


   Last Admin: 11/27/17 09:49 Dose:  1,500 mg


Pyridoxine HCl (Vitamin B6 50 Mg Tab)  50 mg PO DAILY [facility]


   Last Admin: 11/27/17 09:49 Dose:  50 mg


Rifampin (Rifampin Cap)  600 mg PO DAILY [facility]


   Last Admin: 11/27/17 09:49 Dose:  600 mg


Fluticasone/Salmeterol (Advair Diskus 250/50)  1 puff INH RQ12 [facility]


   Last Admin: 11/28/17 07:32 Dose:  1 puff











- Labs


Labs: 


 





 11/28/17 07:18 





 11/28/17 07:18 





 











PT  13.8 SECONDS (9.7-12.2)  H  10/30/17  12:59    


 


INR  1.2   10/30/17  12:59    


 


APTT  38 SECONDS (21-34)  H  10/30/17  12:59    














- Constitutional


Appears: No Acute Distress





- Eye Exam


Eye Exam: Normal appearance





- ENT Exam


ENT Exam: Mucous Membranes Moist





- Neck Exam


Neck Exam: Full ROM.  absent: Lymphadenopathy, Thyromegaly





- Respiratory Exam


Respiratory Exam: Clear to Ausculation Bilateral.  absent: Rales, Rhonchi, 

Wheezes





- Cardiovascular Exam


Cardiovascular Exam: REGULAR RHYTHM, +S1, +S2.  absent: Gallop, Murmur





- GI/Abdominal Exam


GI & Abdominal Exam: Soft.  absent: Tenderness, Mass





- Extremities Exam


Extremities Exam: Calf Tenderness, Full ROM, Normal Capillary Refill, Normal 

Inspection.  absent: Joint Swelling, Pedal Edema





Assessment and Plan





- Assessment and Plan (Free Text)


Assessment: 





PTB/ Pneumonia - clinically improve


Lung mass


HTN





Cont meds/ repeat CXR

## 2017-11-29 RX ADMIN — FLUTICASONE PROPIONATE AND SALMETEROL SCH PUFF: 50; 250 POWDER RESPIRATORY (INHALATION) at 21:52

## 2017-11-29 RX ADMIN — FLUTICASONE PROPIONATE AND SALMETEROL SCH PUFF: 50; 250 POWDER RESPIRATORY (INHALATION) at 07:51

## 2017-11-29 NOTE — CP.PCM.PN
Subjective





- Date & Time of Evaluation


Date of Evaluation: 11/29/17


Time of Evaluation: 08:30





- Subjective


Subjective: 





Pt no complain; Walk in corridor when he can


No CP, no SOB, no edema, no cough





Objective





- Vital Signs/Intake and Output


Vital Signs (last 24 hours): 


 











Temp Pulse Resp BP Pulse Ox


 


 98.3 F   76   18   102/69   95 


 


 11/29/17 00:00  11/29/17 00:00  11/29/17 00:00  11/29/17 00:00  11/29/17 00:00








Intake and Output: 


 











 11/29/17 11/29/17





 06:59 18:59


 


Intake Total 1000 


 


Balance 1000 














- Medications


Medications: 


 Current Medications





Ethambutol HCl (Myambutol)  1,200 mg PO DAILY [facility]


   Last Admin: 11/28/17 09:41 Dose:  1,200 mg


Isoniazid (Niazid)  300 mg PO DAILY [facility]


   Last Admin: 11/28/17 09:41 Dose:  300 mg


Losartan Potassium (Cozaar)  50 mg PO DAILY [facility]


   Last Admin: 11/28/17 09:41 Dose:  50 mg


Montelukast Sodium (Singulair)  10 mg PO HS [facility]


   Last Admin: 11/28/17 21:14 Dose:  10 mg


Pyrazinamide (Pyrazinamide)  1,500 mg PO DAILY [facility]


   Last Admin: 11/28/17 09:41 Dose:  1,500 mg


Pyridoxine HCl (Vitamin B6 50 Mg Tab)  50 mg PO DAILY [facility]


   Last Admin: 11/28/17 09:41 Dose:  50 mg


Rifampin (Rifampin Cap)  600 mg PO DAILY [facility]


   Last Admin: 11/28/17 09:41 Dose:  600 mg


Fluticasone/Salmeterol (Advair Diskus 250/50)  1 puff INH RQ12 [facility]


   Last Admin: 11/29/17 07:51 Dose:  1 puff











- Labs


Labs: 


 





 11/28/17 07:18 





 11/28/17 07:18 





 











PT  13.8 SECONDS (9.7-12.2)  H  10/30/17  12:59    


 


INR  1.2   10/30/17  12:59    


 


APTT  38 SECONDS (21-34)  H  10/30/17  12:59    














- Constitutional


Appears: No Acute Distress





- Eye Exam


Eye Exam: Normal appearance





- ENT Exam


ENT Exam: Mucous Membranes Moist





- Neck Exam


Neck Exam: Full ROM.  absent: Lymphadenopathy, Thyromegaly





- Respiratory Exam


Respiratory Exam: Clear to Ausculation Bilateral.  absent: Rales, Rhonchi, 

Wheezes





- Cardiovascular Exam


Cardiovascular Exam: +S1, +S2.  absent: Gallop, REGULAR RHYTHM, JVD, Murmur





- GI/Abdominal Exam


GI & Abdominal Exam: Soft.  absent: Tenderness, Mass





- Extremities Exam


Extremities Exam: Full ROM, Normal Capillary Refill.  absent: Calf Tenderness, 

Joint Swelling, Pedal Edema





Assessment and Plan





- Assessment and Plan (Free Text)


Assessment: 





Lung mass


Pneumonia; PTB


HTN





Cont meds


Repeat CXR

## 2017-11-29 NOTE — RAD
HISTORY:

F/up  



COMPARISON:

CT chest from 11/17/2017



TECHNIQUE:

Chest PA, apical and lateral



FINDINGS:



LUNGS:

There is redemonstration of confluent airspace disease in both 

posterior upper lobes, worse on the right. There is also involvement 

of the left lung apex.  The right lung apex is clear. 



PLEURA:

No significant pleural effusion identified. No pneumothorax apparent.



CARDIOVASCULAR:

Normal.



OSSEOUS STRUCTURES:

No significant abnormalities.



VISUALIZED UPPER ABDOMEN:

Normal.



OTHER FINDINGS:

None.



IMPRESSION:

Little interval change in known confluent chronic airspace disease in 

both posterior upper lobes, worse on the right.  Involvement of the 

left lung apex.  No evidence of mass or disease in the right lung 

apex.

## 2017-11-30 RX ADMIN — FLUTICASONE PROPIONATE AND SALMETEROL SCH PUFF: 50; 250 POWDER RESPIRATORY (INHALATION) at 20:00

## 2017-11-30 RX ADMIN — FLUTICASONE PROPIONATE AND SALMETEROL SCH PUFF: 50; 250 POWDER RESPIRATORY (INHALATION) at 10:18

## 2017-11-30 NOTE — CP.PCM.PN
Subjective





- Date & Time of Evaluation


Date of Evaluation: 11/30/17


Time of Evaluation: 09:30





- Subjective


Subjective: 





Pt no complain; no CP, no cough, no edema, no n/v





Objective





- Vital Signs/Intake and Output


Vital Signs (last 24 hours): 


 











Temp Pulse Resp BP Pulse Ox


 


 97.9 F   91 H  20   124/80   97 


 


 11/30/17 08:00  11/30/17 08:00  11/30/17 08:00  11/30/17 08:00  11/30/17 08:00








Intake and Output: 


 











 11/30/17 11/30/17





 06:59 18:59


 


Intake Total 1400 


 


Balance 1400 














- Medications


Medications: 


 Current Medications





Ethambutol HCl (Myambutol)  1,200 mg PO DAILY [facility]


   Last Admin: 11/30/17 09:23 Dose:  1,200 mg


Isoniazid (Niazid)  300 mg PO DAILY [facility]


   Last Admin: 11/30/17 09:22 Dose:  300 mg


Losartan Potassium (Cozaar)  50 mg PO DAILY [facility]


   Last Admin: 11/30/17 09:21 Dose:  50 mg


Montelukast Sodium (Singulair)  10 mg PO HS [facility]


   Last Admin: 11/29/17 21:03 Dose:  10 mg


Pyrazinamide (Pyrazinamide)  1,500 mg PO DAILY [facility]


   Last Admin: 11/30/17 09:23 Dose:  1,500 mg


Pyridoxine HCl (Vitamin B6 50 Mg Tab)  50 mg PO DAILY [facility]


   Last Admin: 11/30/17 09:23 Dose:  50 mg


Rifampin (Rifampin Cap)  600 mg PO DAILY [facility]


   Last Admin: 11/30/17 09:22 Dose:  600 mg


Fluticasone/Salmeterol (Advair Diskus 250/50)  1 puff INH RQ12 [facility]


   Last Admin: 11/29/17 21:52 Dose:  1 puff











- Labs


Labs: 


 





 11/28/17 07:18 





 11/28/17 07:18 





 











PT  13.8 SECONDS (9.7-12.2)  H  10/30/17  12:59    


 


INR  1.2   10/30/17  12:59    


 


APTT  38 SECONDS (21-34)  H  10/30/17  12:59    














- Constitutional


Appears: No Acute Distress





- Eye Exam


Eye Exam: Normal appearance





- ENT Exam


ENT Exam: Mucous Membranes Moist





- Neck Exam


Neck Exam: Full ROM.  absent: Lymphadenopathy, Thyromegaly





- Respiratory Exam


Respiratory Exam: Clear to Ausculation Bilateral.  absent: Rales, Rhonchi, 

Wheezes





- GI/Abdominal Exam


GI & Abdominal Exam: Soft.  absent: Tenderness, Mass





- Extremities Exam


Extremities Exam: Full ROM, Normal Capillary Refill.  absent: Calf Tenderness, 

Joint Swelling, Pedal Edema





Assessment and Plan





- Assessment and Plan (Free Text)


Assessment: 





PTB; pneumonia


HTN





Cont meds


CXR reviewed - no change

## 2017-12-01 RX ADMIN — FLUTICASONE PROPIONATE AND SALMETEROL SCH PUFF: 50; 250 POWDER RESPIRATORY (INHALATION) at 20:02

## 2017-12-01 RX ADMIN — FLUTICASONE PROPIONATE AND SALMETEROL SCH PUFF: 50; 250 POWDER RESPIRATORY (INHALATION) at 09:17

## 2017-12-01 NOTE — CP.PCM.PN
Subjective





- Date & Time of Evaluation


Date of Evaluation: 12/01/17


Time of Evaluation: 07:00





- Subjective


Subjective: 





await 3 neg smears





Objective





- Vital Signs/Intake and Output


Vital Signs (last 24 hours): 


 











Temp Pulse Resp BP Pulse Ox


 


 98.0 F   99 H  20   121/83   96 


 


 12/01/17 07:08  12/01/17 07:08  12/01/17 07:08  12/01/17 07:08  12/01/17 07:08








Intake and Output: 


 











 12/01/17 12/01/17





 06:59 18:59


 


Intake Total 240 240


 


Balance 240 240














- Medications


Medications: 


 Current Medications





Ethambutol HCl (Myambutol)  1,200 mg PO DAILY [facility]


   Last Admin: 12/01/17 09:52 Dose:  1,200 mg


Isoniazid (Niazid)  300 mg PO DAILY [facility]


   Last Admin: 12/01/17 09:53 Dose:  300 mg


Losartan Potassium (Cozaar)  50 mg PO DAILY [facility]


   Last Admin: 12/01/17 09:53 Dose:  50 mg


Montelukast Sodium (Singulair)  10 mg PO [facility]


   Last Admin: 11/30/17 21:21 Dose:  10 mg


Pyrazinamide (Pyrazinamide)  1,500 mg PO DAILY [facility]


   Last Admin: 12/01/17 09:53 Dose:  1,500 mg


Pyridoxine HCl (Vitamin B6 50 Mg Tab)  50 mg PO DAILY [facility]


   Last Admin: 12/01/17 09:53 Dose:  50 mg


Rifampin (Rifampin Cap)  600 mg PO DAILY [facility]


   Last Admin: 12/01/17 09:53 Dose:  600 mg


Fluticasone/Salmeterol (Advair Diskus 250/50)  1 puff INH RQ12 [facility]


   Last Admin: 12/01/17 09:17 Dose:  1 puff











- Labs


Labs: 


 





 11/28/17 07:18 





 11/28/17 07:18 





 











PT  13.8 SECONDS (9.7-12.2)  H  10/30/17  12:59    


 


INR  1.2   10/30/17  12:59    


 


APTT  38 SECONDS (21-34)  H  10/30/17  12:59    














- Constitutional


Appears: Non-toxic, Chronically Ill





- Head Exam


Head Exam: NORMOCEPHALIC





- Eye Exam


Eye Exam: PERRL





- ENT Exam


ENT Exam: Mucous Membranes Dry





- Neck Exam


Neck Exam: absent: Lymphadenopathy





- Respiratory Exam


Respiratory Exam: Decreased Breath Sounds





- Cardiovascular Exam


Cardiovascular Exam: REGULAR RHYTHM





- GI/Abdominal Exam


GI & Abdominal Exam: Distended





Assessment and Plan


(1) Hemoptysis


Status: Acute   





(2) Pneumonia


Status: Acute   





(3) Tuberculosis


Status: Acute   





(4) Tuberculosis


Status: Acute

## 2017-12-01 NOTE — CP.PCM.PN
Subjective





- Date & Time of Evaluation


Date of Evaluation: 12/01/17


Time of Evaluation: 08:25





- Subjective


Subjective: 





pt feels well; frustrated c/o (+) AFB still


No CP, no SOB, no edema, almost no cough








Objective





- Vital Signs/Intake and Output


Vital Signs (last 24 hours): 


 











Temp Pulse Resp BP Pulse Ox


 


 97.9 F   72   20   107/70   96 


 


 11/30/17 23:50  11/30/17 23:50  11/30/17 23:50  11/30/17 23:50  11/30/17 23:50








Intake and Output: 


 











 12/01/17 12/01/17





 06:59 18:59


 


Intake Total 240 


 


Balance 240 














- Medications


Medications: 


 Current Medications





Ethambutol HCl (Myambutol)  1,200 mg PO DAILY [facility]


   Last Admin: 11/30/17 09:23 Dose:  1,200 mg


Isoniazid (Niazid)  300 mg PO DAILY [facility]


Losartan Potassium (Cozaar)  50 mg PO DAILY [facility]


   Last Admin: 11/30/17 09:21 Dose:  50 mg


Montelukast Sodium (Singulair)  10 mg PO HS [facility]


   Last Admin: 11/30/17 21:21 Dose:  10 mg


Pyrazinamide (Pyrazinamide)  1,500 mg PO DAILY [facility]


   Last Admin: 11/30/17 09:23 Dose:  1,500 mg


Pyridoxine HCl (Vitamin B6 50 Mg Tab)  50 mg PO DAILY [facility]


   Last Admin: 11/30/17 09:23 Dose:  50 mg


Rifampin (Rifampin Cap)  600 mg PO DAILY [facility]


   Last Admin: 11/30/17 09:22 Dose:  600 mg


Fluticasone/Salmeterol (Advair Diskus 250/50)  1 puff INH RQ12 [facility]


   Last Admin: 11/30/17 20:00 Dose:  1 puff











- Labs


Labs: 


 





 11/28/17 07:18 





 11/28/17 07:18 





 











PT  13.8 SECONDS (9.7-12.2)  H  10/30/17  12:59    


 


INR  1.2   10/30/17  12:59    


 


APTT  38 SECONDS (21-34)  H  10/30/17  12:59    














- Constitutional


Appears: No Acute Distress





- Eye Exam


Eye Exam: Normal appearance





- ENT Exam


ENT Exam: Mucous Membranes Moist





- Neck Exam


Neck Exam: Full ROM.  absent: Lymphadenopathy





- Respiratory Exam


Respiratory Exam: Clear to Ausculation Bilateral, Rales.  absent: Decreased 

Breath Sounds, Rhonchi, Wheezes





- Cardiovascular Exam


Cardiovascular Exam: REGULAR RHYTHM, +S1, +S2.  absent: Gallop, JVD





- GI/Abdominal Exam


GI & Abdominal Exam: Soft.  absent: Tenderness, Mass





- Extremities Exam


Extremities Exam: Full ROM, Normal Capillary Refill.  absent: Calf Tenderness, 

Joint Swelling, Pedal Edema





Assessment and Plan





- Assessment and Plan (Free Text)


Assessment: 





Lung mass? ; P{neumna w/ PTB


HTN





Cont meds/ supportive care

## 2017-12-01 NOTE — CP.PCM.PN
Subjective





- Date & Time of Evaluation


Date of Evaluation: 12/01/17


Time of Evaluation: 09:00





- Subjective


Subjective: 





Denies fever chills, denies cough, denies hemoptysis


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


Continue present medication


Sputum AFB x3 once negative DC isolation








Objective





- Vital Signs/Intake and Output


Vital Signs (last 24 hours): 


 











Temp Pulse Resp BP Pulse Ox


 


 98.0 F   99 H  20   121/83   96 


 


 12/01/17 07:08  12/01/17 07:08  12/01/17 07:08  12/01/17 07:08  12/01/17 07:08








Intake and Output: 


 











 12/01/17 12/01/17





 06:59 18:59


 


Intake Total 240 240


 


Balance 240 240














- Medications


Medications: 


 Current Medications





Ethambutol HCl (Myambutol)  1,200 mg PO DAILY [facility]


   Last Admin: 12/01/17 09:52 Dose:  1,200 mg


Isoniazid (Niazid)  300 mg PO DAILY [facility]


   Last Admin: 12/01/17 09:53 Dose:  300 mg


Losartan Potassium (Cozaar)  50 mg PO DAILY [facility]


   Last Admin: 12/01/17 09:53 Dose:  50 mg


Montelukast Sodium (Singulair)  10 mg PO HS [facility]


   Last Admin: 11/30/17 21:21 Dose:  10 mg


Pyrazinamide (Pyrazinamide)  1,500 mg PO DAILY [facility]


   Last Admin: 12/01/17 09:53 Dose:  1,500 mg


Pyridoxine HCl (Vitamin B6 50 Mg Tab)  50 mg PO DAILY [facility]


   Last Admin: 12/01/17 09:53 Dose:  50 mg


Rifampin (Rifampin Cap)  600 mg PO DAILY [facility]


   Last Admin: 12/01/17 09:53 Dose:  600 mg


Fluticasone/Salmeterol (Advair Diskus 250/50)  1 puff INH RQ12 [facility]


   Last Admin: 12/01/17 09:17 Dose:  1 puff











- Labs


Labs: 


 





 11/28/17 07:18 





 11/28/17 07:18 





 











PT  13.8 SECONDS (9.7-12.2)  H  10/30/17  12:59    


 


INR  1.2   10/30/17  12:59    


 


APTT  38 SECONDS (21-34)  H  10/30/17  12:59    














Assessment and Plan


(1) Tuberculosis


Status: Acute   





(2) Hemoptysis


Status: Acute

## 2017-12-02 RX ADMIN — FLUTICASONE PROPIONATE AND SALMETEROL SCH PUFF: 50; 250 POWDER RESPIRATORY (INHALATION) at 08:21

## 2017-12-02 RX ADMIN — FLUTICASONE PROPIONATE AND SALMETEROL SCH PUFF: 50; 250 POWDER RESPIRATORY (INHALATION) at 08:22

## 2017-12-02 NOTE — CP.PCM.PN
Subjective





- Date & Time of Evaluation


Date of Evaluation: 12/02/17


Time of Evaluation: 08:01





- Subjective


Subjective: 





Pt no complain; AFB still persist.


No CP, no SOB, no edema, no cough


Mycobacterium - sensitive to all meds








Objective





- Vital Signs/Intake and Output


Vital Signs (last 24 hours): 


 











Temp Pulse Resp BP Pulse Ox


 


 97.9 F   91 H  19   110/76   95 


 


 12/02/17 07:35  12/02/17 07:35  12/02/17 07:35  12/02/17 07:35  12/02/17 07:35








Intake and Output: 


 











 12/02/17 12/02/17





 06:59 18:59


 


Intake Total 1240 


 


Balance 1240 














- Medications


Medications: 


 Current Medications





Ethambutol HCl (Myambutol)  1,200 mg PO DAILY [facility]


   Last Admin: 12/01/17 09:52 Dose:  1,200 mg


Isoniazid (Niazid)  300 mg PO DAILY [facility]


   Last Admin: 12/01/17 09:53 Dose:  300 mg


Losartan Potassium (Cozaar)  50 mg PO DAILY [facility]


   Last Admin: 12/01/17 09:53 Dose:  50 mg


Montelukast Sodium (Singulair)  10 mg PO HS [facility]


   Last Admin: 12/01/17 21:13 Dose:  10 mg


Pyrazinamide (Pyrazinamide)  1,500 mg PO DAILY [facility]


   Last Admin: 12/01/17 09:53 Dose:  1,500 mg


Pyridoxine HCl (Vitamin B6 50 Mg Tab)  50 mg PO DAILY [facility]


   Last Admin: 12/01/17 09:53 Dose:  50 mg


Rifampin (Rifampin Cap)  600 mg PO DAILY [facility]


   Last Admin: 12/01/17 09:53 Dose:  600 mg


Fluticasone/Salmeterol (Advair Diskus 250/50)  1 puff INH RQ12 [facility]


   Last Admin: 12/01/17 20:02 Dose:  1 puff











- Labs


Labs: 


 





 11/28/17 07:18 





 11/28/17 07:18 





 











PT  13.8 SECONDS (9.7-12.2)  H  10/30/17  12:59    


 


INR  1.2   10/30/17  12:59    


 


APTT  38 SECONDS (21-34)  H  10/30/17  12:59    














- Constitutional


Appears: No Acute Distress





- Eye Exam


Eye Exam: Normal appearance





- ENT Exam


ENT Exam: Mucous Membranes Moist





- Neck Exam


Neck Exam: Full ROM.  absent: Lymphadenopathy, Normal Inspection, Thyromegaly





- Respiratory Exam


Respiratory Exam: Clear to Ausculation Bilateral.  absent: Rales, Rhonchi, 

Wheezes





- Cardiovascular Exam


Cardiovascular Exam: REGULAR RHYTHM, +S1, +S2, Murmur.  absent: Gallop, JVD





- GI/Abdominal Exam


GI & Abdominal Exam: Soft.  absent: Tenderness, Mass





- Extremities Exam


Extremities Exam: Full ROM, Normal Capillary Refill.  absent: Calf Tenderness, 

Joint Swelling, Pedal Edema





Assessment and Plan





- Assessment and Plan (Free Text)


Assessment: 





PTB; HTN





Cont meds;

## 2017-12-03 RX ADMIN — FLUTICASONE PROPIONATE AND SALMETEROL SCH PUFF: 50; 250 POWDER RESPIRATORY (INHALATION) at 08:00

## 2017-12-03 NOTE — CP.PCM.PN
Subjective





- Date & Time of Evaluation


Date of Evaluation: 12/03/17


Time of Evaluation: 09:00





- Subjective


Subjective: 





tolerating rx








Objective





- Vital Signs/Intake and Output


Vital Signs (last 24 hours): 


 











Temp Pulse Resp BP Pulse Ox


 


 97.9 F   89   20   127/79   95 


 


 12/03/17 09:18  12/03/17 09:18  12/03/17 09:18  12/03/17 09:18  12/03/17 09:18











- Medications


Medications: 


 Current Medications





Ethambutol HCl (Myambutol)  1,200 mg PO DAILY [facility]


   Last Admin: 12/03/17 10:00 Dose:  1,200 mg


Isoniazid (Niazid)  300 mg PO DAILY [facility]


   Last Admin: 12/03/17 10:00 Dose:  300 mg


Losartan Potassium (Cozaar)  50 mg PO DAILY [facility]


   Last Admin: 12/03/17 09:55 Dose:  50 mg


Pyrazinamide (Pyrazinamide)  1,500 mg PO DAILY [facility]


   Last Admin: 12/03/17 09:52 Dose:  1,500 mg


Pyridoxine HCl (Vitamin B6 50 Mg Tab)  50 mg PO DAILY [facility]


   Last Admin: 12/03/17 09:55 Dose:  50 mg


Rifampin (Rifampin Cap)  600 mg PO DAILY [facility]


   Last Admin: 12/03/17 09:53 Dose:  600 mg


Fluticasone/Salmeterol (Advair Diskus 250/50)  1 puff INH RQ12 [facility]


   Last Admin: 12/03/17 08:00 Dose:  1 puff











- Labs


Labs: 


 





 11/28/17 07:18 





 11/28/17 07:18 





 











PT  13.8 SECONDS (9.7-12.2)  H  10/30/17  12:59    


 


INR  1.2   10/30/17  12:59    


 


APTT  38 SECONDS (21-34)  H  10/30/17  12:59    














- Constitutional


Appears: Non-toxic, Chronically Ill





- Head Exam


Head Exam: NORMOCEPHALIC





- Eye Exam


Eye Exam: PERRL





- ENT Exam


ENT Exam: Mucous Membranes Dry





- Neck Exam


Neck Exam: absent: Lymphadenopathy





- Respiratory Exam


Respiratory Exam: Decreased Breath Sounds





- Cardiovascular Exam


Cardiovascular Exam: REGULAR RHYTHM





- GI/Abdominal Exam


GI & Abdominal Exam: Distended, Soft





- Rectal Exam


Rectal Exam: Deferred





Assessment and Plan


(1) Hemoptysis


Status: Acute   





(2) Pneumonia


Status: Acute   





(3) Tuberculosis


Status: Acute   





(4) Tuberculosis


Status: Acute   





- Assessment and Plan (Free Text)


Assessment: 





await 3 neg smears


cont ripe  x 2 mos then inh/rif for 6 mos

## 2017-12-04 RX ADMIN — FLUTICASONE PROPIONATE AND SALMETEROL SCH: 50; 250 POWDER RESPIRATORY (INHALATION) at 08:11

## 2017-12-04 RX ADMIN — FLUTICASONE PROPIONATE AND SALMETEROL SCH PUFF: 50; 250 POWDER RESPIRATORY (INHALATION) at 20:20

## 2017-12-04 NOTE — CP.PCM.PN
Subjective





- Date & Time of Evaluation


Date of Evaluation: 12/04/17


Time of Evaluation: 09:40





- Subjective


Subjective: 





Pt walking in corridor; Does walk 30-40 ins/d


No CP, no SOB, no cough, no n/v





Objective





- Vital Signs/Intake and Output


Vital Signs (last 24 hours): 


 











Temp Pulse Resp BP Pulse Ox


 


 98.0 F   68   20   108/71   95 


 


 12/03/17 23:00  12/03/17 23:00  12/03/17 23:00  12/03/17 23:00  12/03/17 23:00











- Medications


Medications: 


 Current Medications





Ethambutol HCl (Myambutol)  1,200 mg PO DAILY [facility]


   Last Admin: 12/03/17 10:00 Dose:  1,200 mg


Isoniazid (Niazid)  300 mg PO DAILY [facility]


   Last Admin: 12/03/17 10:00 Dose:  300 mg


Losartan Potassium (Cozaar)  50 mg PO DAILY [facility]


   Last Admin: 12/03/17 09:55 Dose:  50 mg


Pyrazinamide (Pyrazinamide)  1,500 mg PO DAILY [facility]


   Last Admin: 12/03/17 09:52 Dose:  1,500 mg


Pyridoxine HCl (Vitamin B6 50 Mg Tab)  50 mg PO DAILY [facility]


   Last Admin: 12/03/17 09:55 Dose:  50 mg


Rifampin (Rifampin Cap)  600 mg PO DAILY [facility]


   Last Admin: 12/03/17 09:53 Dose:  600 mg


Fluticasone/Salmeterol (Advair Diskus 250/50)  1 puff INH RQ12 [facility]


   Last Admin: 12/04/17 08:11 Dose:  Not Given











- Labs


Labs: 


 





 11/28/17 07:18 





 11/28/17 07:18 





 











PT  13.8 SECONDS (9.7-12.2)  H  10/30/17  12:59    


 


INR  1.2   10/30/17  12:59    


 


APTT  38 SECONDS (21-34)  H  10/30/17  12:59    














- Constitutional


Appears: No Acute Distress





- Eye Exam


Eye Exam: Normal appearance





- ENT Exam


ENT Exam: Mucous Membranes Moist





- Neck Exam


Neck Exam: Full ROM





- Respiratory Exam


Respiratory Exam: Clear to Ausculation Bilateral.  absent: Rales, Rhonchi, 

Wheezes





- Cardiovascular Exam


Cardiovascular Exam: REGULAR RHYTHM, +S1, +S2.  absent: Gallop, Murmur





- GI/Abdominal Exam


GI & Abdominal Exam: Soft.  absent: Tenderness





- Extremities Exam


Extremities Exam: Full ROM, Normal Capillary Refill.  absent: Calf Tenderness, 

Joint Swelling, Pedal Edema





Assessment and Plan





- Assessment and Plan (Free Text)


Assessment: 





PTB; lung mass


HTN





cont meds


For repeat AFB

## 2017-12-04 NOTE — CP.PCM.PN
Subjective





- Date & Time of Evaluation


Date of Evaluation: 12/04/17


Time of Evaluation: 09:00





- Subjective


Subjective: 





the patient seen and examined


Sitting and walking without any distress


Denies cough, denies fever, denies night sweats





Objective





- Vital Signs/Intake and Output


Vital Signs (last 24 hours): 


 











Temp Pulse Resp BP Pulse Ox


 


 98.2 F   76   20   121/72   97 


 


 12/04/17 15:45  12/04/17 15:45  12/04/17 15:45  12/04/17 15:45  12/04/17 15:45











- Medications


Medications: 


 Current Medications





Ethambutol HCl (Myambutol)  1,200 mg PO DAILY [facility]


   Last Admin: 12/04/17 10:27 Dose:  1,200 mg


Isoniazid (Niazid)  300 mg PO DAILY [facility]


   Last Admin: 12/04/17 10:26 Dose:  300 mg


Losartan Potassium (Cozaar)  50 mg PO DAILY [facility]


   Last Admin: 12/04/17 10:26 Dose:  50 mg


Pyrazinamide (Pyrazinamide)  1,500 mg PO DAILY [facility]


   Last Admin: 12/04/17 10:27 Dose:  1,500 mg


Pyridoxine HCl (Vitamin B6 50 Mg Tab)  50 mg PO DAILY [facility]


   Last Admin: 12/04/17 10:26 Dose:  50 mg


Rifampin (Rifampin Cap)  600 mg PO DAILY [facility]


   Last Admin: 12/04/17 10:26 Dose:  600 mg


Fluticasone/Salmeterol (Advair Diskus 250/50)  1 puff INH RQ12 [facility]


   Last Admin: 12/04/17 08:11 Dose:  Not Given











- Labs


Labs: 


 





 11/28/17 07:18 





 11/28/17 07:18 





 











PT  13.8 SECONDS (9.7-12.2)  H  10/30/17  12:59    


 


INR  1.2   10/30/17  12:59    


 


APTT  38 SECONDS (21-34)  H  10/30/17  12:59    














- Constitutional


Appears: No Acute Distress





- Head Exam


Head Exam: ATRAUMATIC, NORMOCEPHALIC





- ENT Exam


ENT Exam: Mucous Membranes Moist





- Neck Exam


Neck Exam: Normal Inspection





- Respiratory Exam


Respiratory Exam: Decreased Breath Sounds





- Cardiovascular Exam


Cardiovascular Exam: REGULAR RHYTHM





- GI/Abdominal Exam


GI & Abdominal Exam: Soft, Normal Bowel Sounds





- Extremities Exam


Extremities Exam: Normal Inspection





- Neurological Exam


Neurological Exam: Alert





Assessment and Plan


(1) Tuberculosis


Assessment & Plan: 


continue antituberculous medica


AFB still positive when 3 AFB negative


Status: Acute   





(2) Hemoptysis


Status: Acute

## 2017-12-05 RX ADMIN — FLUTICASONE PROPIONATE AND SALMETEROL SCH: 50; 250 POWDER RESPIRATORY (INHALATION) at 21:59

## 2017-12-05 RX ADMIN — FLUTICASONE PROPIONATE AND SALMETEROL SCH: 50; 250 POWDER RESPIRATORY (INHALATION) at 08:18

## 2017-12-05 NOTE — CP.PCM.PN
Subjective





- Date & Time of Evaluation


Date of Evaluation: 12/05/17


Time of Evaluation: 08:25





- Subjective


Subjective: 





Pt no CP, no SOB, no edema, (+) dry cough


no n/v, no diarrhea





Objective





- Vital Signs/Intake and Output


Vital Signs (last 24 hours): 


 











Temp Pulse Resp BP Pulse Ox


 


 97.9 F   99 H  20   119/79   98 


 


 12/05/17 07:00  12/05/17 07:00  12/05/17 07:00  12/05/17 07:00  12/05/17 07:00








Intake and Output: 


 











 12/05/17 12/05/17





 06:59 18:59


 


Intake Total 1400 


 


Balance 1400 














- Medications


Medications: 


 Current Medications





Ethambutol HCl (Myambutol)  1,200 mg PO DAILY [facility]


   Last Admin: 12/04/17 10:27 Dose:  1,200 mg


Isoniazid (Niazid)  300 mg PO DAILY [facility]


   Last Admin: 12/04/17 10:26 Dose:  300 mg


Losartan Potassium (Cozaar)  50 mg PO DAILY [facility]


   Last Admin: 12/04/17 10:26 Dose:  50 mg


Pyrazinamide (Pyrazinamide)  1,500 mg PO DAILY [facility]


   Last Admin: 12/04/17 10:27 Dose:  1,500 mg


Pyridoxine HCl (Vitamin B6 50 Mg Tab)  50 mg PO DAILY [facility]


   Last Admin: 12/04/17 10:26 Dose:  50 mg


Rifampin (Rifampin Cap)  600 mg PO DAILY [facility]


   Last Admin: 12/04/17 10:26 Dose:  600 mg


Fluticasone/Salmeterol (Advair Diskus 250/50)  1 puff INH RQ12 [facility]


   Last Admin: 12/05/17 08:18 Dose:  Not Given











- Labs


Labs: 


 





 11/28/17 07:18 





 11/28/17 07:18 





 











PT  13.8 SECONDS (9.7-12.2)  H  10/30/17  12:59    


 


INR  1.2   10/30/17  12:59    


 


APTT  38 SECONDS (21-34)  H  10/30/17  12:59    














- Constitutional


Appears: No Acute Distress





- Eye Exam


Eye Exam: Normal appearance





- ENT Exam


ENT Exam: Mucous Membranes Moist





- Respiratory Exam


Respiratory Exam: Clear to Ausculation Bilateral.  absent: Rhonchi, Wheezes





- Cardiovascular Exam


Cardiovascular Exam: REGULAR RHYTHM, +S1, +S2.  absent: Gallop, JVD, Murmur





- GI/Abdominal Exam


GI & Abdominal Exam: Soft.  absent: Tenderness, Mass





- Extremities Exam


Extremities Exam: Full ROM, Normal Capillary Refill.  absent: Calf Tenderness, 

Joint Swelling, Pedal Edema





Assessment and Plan





- Assessment and Plan (Free Text)


Plan: 





PTB; ? lung mass


HTN





For AFB x 3 negative


Cont meds

## 2017-12-06 LAB
ALBUMIN/GLOB SERPL: 1.1 {RATIO} (ref 1–2.1)
ALP SERPL-CCNC: 86 U/L (ref 38–126)
ALT SERPL-CCNC: 37 U/L (ref 21–72)
AST SERPL-CCNC: 31 U/L (ref 17–59)
BILIRUB SERPL-MCNC: 0.4 MG/DL (ref 0.2–1.3)
BUN SERPL-MCNC: 14 MG/DL (ref 9–20)
CALCIUM SERPL-MCNC: 8.5 MG/DL (ref 8.6–10.4)
CHLORIDE SERPL-SCNC: 102 MMOL/L (ref 98–107)
CO2 SERPL-SCNC: 27 MMOL/L (ref 22–30)
ERYTHROCYTE [DISTWIDTH] IN BLOOD BY AUTOMATED COUNT: 14.9 % (ref 11.5–14.5)
GLOBULIN SER-MCNC: 3.7 GM/DL (ref 2.2–3.9)
GLUCOSE SERPL-MCNC: 98 MG/DL (ref 75–110)
HCT VFR BLD CALC: 39.7 % (ref 35–51)
MCH RBC QN AUTO: 29.6 PG (ref 27–31)
MCHC RBC AUTO-ENTMCNC: 33.7 G/DL (ref 33–37)
MCV RBC AUTO: 87.8 FL (ref 80–94)
PLATELET # BLD: 361 K/UL (ref 130–400)
PMV BLD AUTO: 7.3 FL (ref 7.2–11.7)
POTASSIUM SERPL-SCNC: 4.1 MMOL/L (ref 3.6–5.2)
PROT SERPL-MCNC: 7.7 G/DL (ref 6.3–8.3)
SODIUM SERPL-SCNC: 139 MMOL/L (ref 132–148)
WBC # BLD AUTO: 7.4 K/UL (ref 4.8–10.8)

## 2017-12-06 NOTE — CP.PCM.PN
Subjective





- Date & Time of Evaluation


Date of Evaluation: 12/06/17


Time of Evaluation: 09:12





- Subjective


Subjective: 





Pt no complain; Had AFB given on 12/04 - still (+) AFB


No cough, no CP, no SOB, no edema.





Objective





- Vital Signs/Intake and Output


Vital Signs (last 24 hours): 


 











Temp Pulse Resp BP Pulse Ox


 


 98.2 F   88   20   106/83   95 


 


 12/06/17 07:00  12/06/17 07:00  12/06/17 07:00  12/06/17 07:00  12/06/17 07:00











- Medications


Medications: 


 Current Medications





Ethambutol HCl (Myambutol)  1,200 mg PO DAILY [facility]


   Last Admin: 12/05/17 09:44 Dose:  1,200 mg


Isoniazid (Niazid)  300 mg PO DAILY [facility]


   Last Admin: 12/05/17 09:44 Dose:  300 mg


Losartan Potassium (Cozaar)  50 mg PO DAILY [facility]


   Last Admin: 12/05/17 09:45 Dose:  50 mg


Pyrazinamide (Pyrazinamide)  1,500 mg PO DAILY [facility]


   Last Admin: 12/05/17 09:45 Dose:  1,500 mg


Pyridoxine HCl (Vitamin B6 50 Mg Tab)  50 mg PO DAILY [facility]


   Last Admin: 12/05/17 09:45 Dose:  50 mg


Rifampin (Rifampin Cap)  600 mg PO DAILY [facility]


   Last Admin: 12/05/17 09:44 Dose:  600 mg


Fluticasone/Salmeterol (Advair Diskus 250/50)  1 puff INH RQ12 [facility]


   Last Admin: 12/05/17 21:59 Dose:  Not Given











- Labs


Labs: 


 





 12/06/17 07:27 





 12/06/17 07:27 





 











PT  13.8 SECONDS (9.7-12.2)  H  10/30/17  12:59    


 


INR  1.2   10/30/17  12:59    


 


APTT  38 SECONDS (21-34)  H  10/30/17  12:59    














- Constitutional


Appears: No Acute Distress





- Eye Exam


Eye Exam: Normal appearance





- ENT Exam


ENT Exam: Mucous Membranes Moist





- Neck Exam


Neck Exam: Full ROM





- Respiratory Exam


Respiratory Exam: Wheezes.  absent: Decreased Breath Sounds





- Cardiovascular Exam


Cardiovascular Exam: REGULAR RHYTHM, +S1, +S2.  absent: Gallop, JVD





- GI/Abdominal Exam


GI & Abdominal Exam: Soft.  absent: Tenderness





- Extremities Exam


Extremities Exam: Full ROM, Normal Capillary Refill.  absent: Calf Tenderness, 

Joint Swelling, Pedal Edema





Assessment and Plan





- Assessment and Plan (Free Text)


Assessment: 





PTB w/ persistent AFB


Lung mass vs  tuberculoma; HTN


Wheezing





Cot RIFE


Cont BP meds/ Adviar

## 2017-12-07 RX ADMIN — FLUTICASONE PROPIONATE AND SALMETEROL SCH PUFF: 50; 250 POWDER RESPIRATORY (INHALATION) at 21:22

## 2017-12-07 RX ADMIN — FLUTICASONE PROPIONATE AND SALMETEROL SCH: 50; 250 POWDER RESPIRATORY (INHALATION) at 10:02

## 2017-12-08 RX ADMIN — FLUTICASONE PROPIONATE AND SALMETEROL SCH PUFF: 50; 250 POWDER RESPIRATORY (INHALATION) at 19:18

## 2017-12-08 RX ADMIN — FLUTICASONE PROPIONATE AND SALMETEROL SCH PUFF: 50; 250 POWDER RESPIRATORY (INHALATION) at 08:16

## 2017-12-08 NOTE — CP.PCM.PN
Subjective





- Date & Time of Evaluation


Date of Evaluation: 12/08/17


Time of Evaluation: 07:55





- Subjective


Subjective: 





Pt no complain; walk in room


No CP, no SOB, no edema, min cough


(+) good appetite, no diarrhea


Labs w/ normal LFT





Objective





- Vital Signs/Intake and Output


Vital Signs (last 24 hours): 


 











Temp Pulse Resp BP Pulse Ox


 


 97.9 F   85   20   119/80   95 


 


 12/08/17 07:53  12/08/17 07:53  12/08/17 07:53  12/08/17 07:53  12/08/17 07:53








Intake and Output: 


 











 12/08/17 12/08/17





 06:59 18:59


 


Intake Total  100


 


Balance  100














- Medications


Medications: 


 Current Medications





Ethambutol HCl (Myambutol)  1,200 mg PO DAILY [facility]


   Last Admin: 12/07/17 10:09 Dose:  1,200 mg


Isoniazid (Niazid)  300 mg PO DAILY [facility]


   Last Admin: 12/07/17 10:10 Dose:  300 mg


Losartan Potassium (Cozaar)  50 mg PO DAILY [facility]


   Last Admin: 12/07/17 10:09 Dose:  50 mg


Pyrazinamide (Pyrazinamide)  1,500 mg PO DAILY [facility]


   Last Admin: 12/07/17 10:09 Dose:  1,500 mg


Pyridoxine HCl (Vitamin B6 50 Mg Tab)  50 mg PO DAILY [facility]


   Last Admin: 12/07/17 10:09 Dose:  50 mg


Rifampin (Rifampin Cap)  600 mg PO DAILY [facility]


   Last Admin: 12/07/17 10:09 Dose:  600 mg


Fluticasone/Salmeterol (Advair Diskus 250/50)  1 puff INH RQ12 [facility]


   Last Admin: 12/07/17 21:22 Dose:  1 puff











- Labs


Labs: 


 





 12/06/17 07:27 





 12/06/17 07:27 





 











PT  13.8 SECONDS (9.7-12.2)  H  10/30/17  12:59    


 


INR  1.2   10/30/17  12:59    


 


APTT  38 SECONDS (21-34)  H  10/30/17  12:59    














- Constitutional


Appears: Younger Than Stated Age





- Eye Exam


Eye Exam: Normal appearance





- ENT Exam


ENT Exam: Mucous Membranes Moist





- Neck Exam


Neck Exam: Full ROM.  absent: Lymphadenopathy, Thyromegaly





- Respiratory Exam


Respiratory Exam: Clear to Ausculation Bilateral.  absent: Rhonchi, Wheezes





- Cardiovascular Exam


Cardiovascular Exam: Gallop, REGULAR RHYTHM, +S1, +S2.  absent: JVD





- GI/Abdominal Exam


GI & Abdominal Exam: Soft.  absent: Tenderness, Mass





- Extremities Exam


Extremities Exam: Full ROM, Normal Capillary Refill.  absent: Calf Tenderness, 

Joint Swelling, Pedal Edema





Assessment and Plan





- Assessment and Plan (Free Text)


Assessment: 





PTB/ lung mass??


HTN





Cont meds/rehab

## 2017-12-09 RX ADMIN — FLUTICASONE PROPIONATE AND SALMETEROL SCH PUFF: 50; 250 POWDER RESPIRATORY (INHALATION) at 08:01

## 2017-12-09 RX ADMIN — FLUTICASONE PROPIONATE AND SALMETEROL SCH PUFF: 50; 250 POWDER RESPIRATORY (INHALATION) at 20:10

## 2017-12-10 RX ADMIN — FLUTICASONE PROPIONATE AND SALMETEROL SCH PUFF: 50; 250 POWDER RESPIRATORY (INHALATION) at 08:16

## 2017-12-10 NOTE — CP.PCM.PN
Subjective





- Date & Time of Evaluation


Date of Evaluation: 12/10/17


Time of Evaluation: 09:00





- Subjective


Subjective: 





rx in progress


await 3 neg sputum





Objective





- Vital Signs/Intake and Output


Vital Signs (last 24 hours): 


 











Temp Pulse Resp BP Pulse Ox


 


 98.0 F   73   20   134/83   98 


 


 12/10/17 08:28  12/10/17 08:28  12/10/17 08:28  12/10/17 08:28  12/10/17 08:28








Intake and Output: 


 











 12/10/17 12/10/17





 06:59 18:59


 


Intake Total 300 


 


Balance 300 














- Medications


Medications: 


 Current Medications





Ethambutol HCl (Myambutol)  1,200 mg PO DAILY [facility]


   Last Admin: 12/10/17 11:15 Dose:  1,200 mg


Isoniazid (Niazid)  300 mg PO DAILY [facility]


   Last Admin: 12/10/17 11:16 Dose:  300 mg


Losartan Potassium (Cozaar)  50 mg PO DAILY [facility]


   Last Admin: 12/10/17 11:16 Dose:  50 mg


Pyrazinamide (Pyrazinamide)  1,500 mg PO DAILY [facility]


   Last Admin: 12/10/17 11:16 Dose:  1,500 mg


Pyridoxine HCl (Vitamin B6 50 Mg Tab)  50 mg PO DAILY [facility]


   Last Admin: 12/10/17 11:16 Dose:  50 mg


Rifampin (Rifampin Cap)  600 mg PO DAILY [facility]


   Last Admin: 12/10/17 11:15 Dose:  600 mg


Fluticasone/Salmeterol (Advair Diskus 250/50)  1 puff INH RQ12 [facility]


   Last Admin: 12/10/17 08:16 Dose:  1 puff











- Labs


Labs: 


 





 12/06/17 07:27 





 12/06/17 07:27 





 











PT  13.8 SECONDS (9.7-12.2)  H  10/30/17  12:59    


 


INR  1.2   10/30/17  12:59    


 


APTT  38 SECONDS (21-34)  H  10/30/17  12:59    














- Constitutional


Appears: Non-toxic, Chronically Ill





- Head Exam


Head Exam: NORMOCEPHALIC





- Eye Exam


Eye Exam: PERRL





- ENT Exam


ENT Exam: Mucous Membranes Dry





- Neck Exam


Neck Exam: absent: Lymphadenopathy





- Respiratory Exam


Respiratory Exam: Decreased Breath Sounds





- Cardiovascular Exam


Cardiovascular Exam: REGULAR RHYTHM





- GI/Abdominal Exam


GI & Abdominal Exam: Distended, Soft





- Rectal Exam


Rectal Exam: Deferred





-  Exam


 Exam: NORMAL INSPECTION





- Extremities Exam


Extremities Exam: absent: Pedal Edema





- Back Exam


Back Exam: absent: CVA tenderness (L), CVA tenderness (R)





Assessment and Plan


(1) Hemoptysis


Status: Acute   





(2) Pneumonia


Status: Acute   





(3) Tuberculosis


Status: Acute   





(4) Tuberculosis


Status: Acute

## 2017-12-11 RX ADMIN — FLUTICASONE PROPIONATE AND SALMETEROL SCH PUFF: 50; 250 POWDER RESPIRATORY (INHALATION) at 08:07

## 2017-12-11 RX ADMIN — FLUTICASONE PROPIONATE AND SALMETEROL SCH PUFF: 50; 250 POWDER RESPIRATORY (INHALATION) at 21:25

## 2017-12-11 NOTE — PCM.PSYCH
Initial Psychiatric Evaluation





- Initial Psychiatric Evaluation


Type of Admission: Voluntary


Legal Status: Capacity


Chief Complaint (in patient's own words): 





"I was anxious only"


History of Present Illness and Precipitating Events: 


The pt is seen, chart reviewed, case discussed


Consult was requested b/c of a suicide remark.





He is a 61 yo [place] male, who is undocumented,  with 5 children but 

has been away from them x12 years now. 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 

in a room.





He is here for TB and is awaiting clean sputums to be LA'ed. He says that he 

was disappointed by a positive result and felt like trapped and wished to die, 

but adamantly denies SI or any intention/plan. He says it has passed now and he 

feels less anxious and not depressed. He says he did noyt mean it when he said 

he wished he were dead.


He practices relaxation exercises daily


No manic or psychotic sxs elicited


He is future oriented, looking forward to go home and reunite with family





No past medical orpsych hx


No family psych hx


Current Medications: 





Active Medications











Generic Name Dose Route Start Last Admin





  Trade Name Freq  PRN Reason Stop Dose Admin


 


Ethambutol HCl  1,200 mg  11/07/17 10:00  12/11/17 09:50





  Myambutol  PO   1,200 mg





  DAILY GABRIEL   Administration


 


Isoniazid  300 mg  12/01/17 10:00  12/11/17 09:50





  Niazid  PO   300 mg





  DAILY GABRIEL   Administration


 


Losartan Potassium  50 mg  10/31/17 10:00  12/11/17 09:50





  Cozaar  PO   50 mg





  DAILY GABRIEL   Administration


 


Pyrazinamide  1,500 mg  11/07/17 10:00  12/11/17 09:50





  Pyrazinamide  PO   1,500 mg





  DAILY GABRIEL   Administration


 


Pyridoxine HCl  50 mg  11/10/17 10:00  12/11/17 09:50





  Vitamin B6 50 Mg Tab  PO   50 mg





  DAILY GABRIEL   Administration


 


Rifampin  600 mg  11/01/17 10:00  12/11/17 09:50





  Rifampin Cap  PO   600 mg





  DAILY GABRIEL   Administration


 


Fluticasone/Salmeterol  1 puff  11/09/17 20:00  12/11/17 08:07





  Advair Diskus 250/50  INH   1 puff





  RQ12 GABRIEL   Administration














Past Psychiatric History





- Past Psychiatric History


Previous Treatment History: None


Pertinent Medical Hx (Current Medical&Sleep Prob, Allergies): 





 Allergies











Allergy/AdvReac Type Severity Reaction Status Date / Time


 


No Known Allergies Allergy   Verified 10/30/17 12:03








 





Losartan [Cozaar] 50 mg PO DAILY 10/30/17 











Review of Systems





- Psychiatric


Psychiatric: Anxiety.  absent: Hallucinations, Homicidal Ideation, Suicidal 

Ideation





Mental Status Examination





- Personal Presentation


Personal Presentation: Looks stated age





- Affect


Affect: Constricted





- Motor Activity


Motor Activity: Calm





- Reliability in Providing Information


Reliability in Providing Information: Good





- Speech


Speech: Organized





- Mood


Mood: Anxious





- Formal Thought Process


Formal Thought Process: No Impairment





- Cognitive Functions


Orientation: Person, Place, Situation, Time


Sensorium: Alert


Attention/Concentration: Attentive


Estimate of Intelligence: Average


Judgement: Intact, as evidence by: Insight regarding need for hospitalization


Memory: Recent intact, as evidence by: Ability to recall events of the day, 

Remote intact, as evidenced by: Abilit to recall sig. life events





- Risk


Risk: Diminished functioning





- Strength & Assets Inventory


Strength & Assets Inventory: Cooperative





- Limitations


Limitations: Living alone





DSM 5 DX





- DSM 5


DSM 5 Diagnosis: 





Adjustment d/o - with anxiety





- Recommended/Plan of Treatment


Treatment Recommendations and Plan of Treatment: 





No need for standing psych meds


prn ativan


No need for 1:1 contracts for safety


Support and psyhoed





Psych will sign off

## 2017-12-11 NOTE — CP.PCM.PN
Subjective





- Date & Time of Evaluation


Date of Evaluation: 12/11/17





- Subjective


Subjective: 


Patient seen and examined. Patient says that he is physically feeling well but 

is struggling mentally due to long hospital stay. Per nursing patient expressed 

S/I earlier and is on watch now. Patient currently denies S/I. Patient says his 

appetite is good, and he is sleeping well. Patient denies any medication side 

effects. Patient denies fevers, chills, shortness of breath, chest pain, 

abdominal pain, diarrhea, constipation and dysuria. 








Objective





- Vital Signs/Intake and Output


Vital Signs (last 24 hours): 


 











Temp Pulse Resp BP Pulse Ox


 


 98.1 F   87   20   143/81   96 


 


 12/11/17 08:18  12/11/17 08:18  12/11/17 08:18  12/11/17 08:18  12/11/17 08:18








Intake and Output: 


 











 12/11/17 12/11/17





 06:59 18:59


 


Intake Total 300 


 


Balance 300 














- Medications


Medications: 


 Current Medications





Ethambutol HCl (Myambutol)  1,200 mg PO DAILY [facility]


   Last Admin: 12/11/17 09:50 Dose:  1,200 mg


Isoniazid (Niazid)  300 mg PO DAILY [facility]


   Last Admin: 12/11/17 09:50 Dose:  300 mg


Losartan Potassium (Cozaar)  50 mg PO DAILY [facility]


   Last Admin: 12/11/17 09:50 Dose:  50 mg


Pyrazinamide (Pyrazinamide)  1,500 mg PO DAILY [facility]


   Last Admin: 12/11/17 09:50 Dose:  1,500 mg


Pyridoxine HCl (Vitamin B6 50 Mg Tab)  50 mg PO DAILY [facility]


   Last Admin: 12/11/17 09:50 Dose:  50 mg


Rifampin (Rifampin Cap)  600 mg PO DAILY [facility]


   Last Admin: 12/11/17 09:50 Dose:  600 mg


Fluticasone/Salmeterol (Advair Diskus 250/50)  1 puff INH RQ12 [facility]


   Last Admin: 12/11/17 08:07 Dose:  1 puff











- Labs


Labs: 


 





 12/06/17 07:27 





 12/06/17 07:27 





 











PT  13.8 SECONDS (9.7-12.2)  H  10/30/17  12:59    


 


INR  1.2   10/30/17  12:59    


 


APTT  38 SECONDS (21-34)  H  10/30/17  12:59    














- Constitutional


Appears: Well, Non-toxic





- Head Exam


Head Exam: ATRAUMATIC, NORMOCEPHALIC





- Eye Exam


Eye Exam: EOMI, Normal appearance





- Neck Exam


Neck Exam: Full ROM, Normal Inspection





- Respiratory Exam


Respiratory Exam: Clear to Ausculation Bilateral, NORMAL BREATHING PATTERN





- Cardiovascular Exam


Cardiovascular Exam: REGULAR RHYTHM





Assessment and Plan


(1) Tuberculosis


Status: Acute   





(2) Hemoptysis


Status: Acute

## 2017-12-12 RX ADMIN — FLUTICASONE PROPIONATE AND SALMETEROL SCH PUFF: 50; 250 POWDER RESPIRATORY (INHALATION) at 19:55

## 2017-12-12 RX ADMIN — FLUTICASONE PROPIONATE AND SALMETEROL SCH PUFF: 50; 250 POWDER RESPIRATORY (INHALATION) at 08:08

## 2017-12-12 NOTE — CP.PCM.PN
Subjective





- Date & Time of Evaluation


Date of Evaluation: 12/12/17


Time of Evaluation: 09:30





- Subjective


Subjective: 





Pt no complain; very frustrated c/o always (+)


No CP, no SOB, no edema, (+) very min cough





Objective





- Vital Signs/Intake and Output


Vital Signs (last 24 hours): 


 











Temp Pulse Resp BP Pulse Ox


 


 98.0 F   79   20   116/78   96 


 


 12/12/17 08:48  12/12/17 08:48  12/12/17 08:48  12/12/17 08:48  12/12/17 08:48











- Medications


Medications: 


 Current Medications





Ethambutol HCl (Myambutol)  1,200 mg PO DAILY [facility]


   Last Admin: 12/11/17 09:50 Dose:  1,200 mg


Isoniazid (Niazid)  300 mg PO DAILY [facility]


   Last Admin: 12/11/17 09:50 Dose:  300 mg


Lorazepam (Ativan)  0.5 mg PO Q6H PRN


   PRN Reason: Anxiety


Losartan Potassium (Cozaar)  50 mg PO DAILY [facility]


   Last Admin: 12/11/17 09:50 Dose:  50 mg


Pyrazinamide (Pyrazinamide)  1,500 mg PO DAILY [facility]


   Last Admin: 12/11/17 09:50 Dose:  1,500 mg


Pyridoxine HCl (Vitamin B6 50 Mg Tab)  50 mg PO DAILY [facility]


   Last Admin: 12/11/17 09:50 Dose:  50 mg


Rifampin (Rifampin Cap)  600 mg PO DAILY [facility]


   Last Admin: 12/11/17 09:50 Dose:  600 mg


Fluticasone/Salmeterol (Advair Diskus 250/50)  1 puff INH RQ12 [facility]


   Last Admin: 12/12/17 08:08 Dose:  1 puff











- Labs


Labs: 


 





 12/06/17 07:27 





 12/06/17 07:27 





 











PT  13.8 SECONDS (9.7-12.2)  H  10/30/17  12:59    


 


INR  1.2   10/30/17  12:59    


 


APTT  38 SECONDS (21-34)  H  10/30/17  12:59    














- Constitutional


Appears: Well





- Eye Exam


Eye Exam: Normal appearance





- ENT Exam


ENT Exam: Mucous Membranes Moist





- Neck Exam


Neck Exam: Full ROM.  absent: Lymphadenopathy, Thyromegaly





- Respiratory Exam


Respiratory Exam: Clear to Ausculation Bilateral.  absent: Decreased Breath 

Sounds, Rales, Rhonchi, Wheezes





- Cardiovascular Exam


Cardiovascular Exam: REGULAR RHYTHM, +S1, +S2.  absent: Gallop, JVD





- GI/Abdominal Exam


GI & Abdominal Exam: Soft.  absent: Tenderness





- Extremities Exam


Extremities Exam: Full ROM, Normal Capillary Refill.  absent: Calf Tenderness, 

Joint Swelling, Pedal Edema





Assessment and Plan





- Assessment and Plan (Free Text)


Assessment: 





PTB w/ ? lung mass - likely tuberculoma


HTN


adjustment disorder





Discuss w/ patient - will be patient


Cont meds

## 2017-12-13 VITALS
HEART RATE: 92 BPM | OXYGEN SATURATION: 95 % | TEMPERATURE: 97.8 F | DIASTOLIC BLOOD PRESSURE: 92 MMHG | SYSTOLIC BLOOD PRESSURE: 169 MMHG

## 2017-12-13 VITALS — RESPIRATION RATE: 20 BRPM

## 2017-12-13 RX ADMIN — FLUTICASONE PROPIONATE AND SALMETEROL SCH PUFF: 50; 250 POWDER RESPIRATORY (INHALATION) at 08:05

## 2017-12-13 NOTE — CT
CT chest without IV contrast 



Indication:  follow up  cavitation . dx TB



Technique: 



Contiguous axial images were obtained through the chest without 

intravenous contrast enhancement. Sagittal and coronal 

reconstructions were generated and reviewed. 



This CT exam was performed using 1 or more of the falling dose 

reduction techniques: Automated exposure control, adjustment of the 

MAA and/or kV according to patient size, and/or use of iterative 

reconstruction technique. 







Radiation dose (DLP):  238.87 MGy-cm. 



Comparison: CT chest without contrast performed 11/17/17 



Findings: 



Visualized portions of the inferior thyroid gland appear 

unremarkable. 



The unenhanced mediastinal and hilar vascular structures appear 

grossly unremarkable.  The heart appears within normal limits of 

size. 8 mm pretracheal lymph node. 11 mm precarinal lymph node. 



Right lung apex cavitary mass re-identified, grossly stable as 

compared to prior study. Sub cm satellite nodules re-identified with 

additional nodular infiltrates involving the left lung apex and 

superior segments of bilateral lower lobes. Left lung apex nodule 

contains calcifications. 



No pleural effusion. No pneumothorax. 



Limited visualization of the noncontrast upper abdomen demonstrates 

cholelithiasis. 







No acute osseous abnormality is detected. 



Impression: 



No significant interval change appreciated with right lung apex 

cavitary mass.  Several sub cm satellite nodules and nodular 

infiltrates noted involving both the left and right lung apices and 

superior segments of bilateral lower lobes. 



Additional findings as above.

## 2017-12-13 NOTE — CP.PCM.PN
Subjective





- Date & Time of Evaluation


Date of Evaluation: 12/13/17


Time of Evaluation: 15:00





- Subjective


Subjective: 








NP NOTES 


Patient seen today , denies any complaints 


a fib x 3 negative 


CT scan done today 





Objective





- Vital Signs/Intake and Output


Vital Signs (last 24 hours): 


 











Temp Pulse Resp BP Pulse Ox


 


 98.9 F   106 H  20   121/79   96 


 


 12/13/17 09:07  12/13/17 09:07  12/13/17 09:07  12/13/17 09:07  12/13/17 09:07








Intake and Output: 


 











 12/13/17 12/13/17





 06:59 18:59


 


Intake Total 400 


 


Balance 400 














- Medications


Medications: 


 Current Medications





Ethambutol HCl (Myambutol)  1,200 mg PO DAILY Replaced by [facility]


   Last Admin: 12/13/17 10:14 Dose:  1,200 mg


Isoniazid (Niazid)  300 mg PO DAILY Replaced by [facility]


   Last Admin: 12/13/17 10:14 Dose:  300 mg


Lorazepam (Ativan)  0.5 mg PO Q6H PRN


   PRN Reason: Anxiety


Losartan Potassium (Cozaar)  50 mg PO DAILY Replaced by [facility]


   Last Admin: 12/13/17 10:15 Dose:  50 mg


Pyrazinamide (Pyrazinamide)  1,500 mg PO DAILY Replaced by [facility]


   Last Admin: 12/13/17 10:14 Dose:  1,500 mg


Pyridoxine HCl (Vitamin B6 50 Mg Tab)  50 mg PO DAILY Replaced by [facility]


   Last Admin: 12/13/17 10:14 Dose:  50 mg


Rifampin (Rifampin Cap)  600 mg PO DAILY Replaced by [facility]


   Last Admin: 12/13/17 10:14 Dose:  600 mg


Fluticasone/Salmeterol (Advair Diskus 250/50)  1 puff INH RQ12 Replaced by [facility]


   Last Admin: 12/13/17 08:05 Dose:  1 puff











- Labs


Labs: 


 





 12/06/17 07:27 





 12/06/17 07:27 





 











PT  13.8 SECONDS (9.7-12.2)  H  10/30/17  12:59    


 


INR  1.2   10/30/17  12:59    


 


APTT  38 SECONDS (21-34)  H  10/30/17  12:59    














Assessment and Plan





- Assessment and Plan (Free Text)


Assessment: 





A/P 





62 yr old  male + AFB   and treated  and  repeat afb negative 


repeat CT DONE today -


seen by Dr. Mathew cleared for discharge from pulmonary stand point and f/u out 

pt 


seen by Lillian ALAN from TB clinic and  all prescriptions given 


d/w Dr. Tariq  stable for discharge home today 


As per CM she contacted TB clinic and made aware of discharge plan 


Discharge plan discussed with patient , who understands and agrees with plan 


patient instructed to returns to ED if symptoms returns

## 2017-12-13 NOTE — CP.PCM.PN
Subjective





- Date & Time of Evaluation


Date of Evaluation: 12/13/17


Time of Evaluation: 12:40





- Subjective


Subjective: 





patient seen and examined


Sitting comfortably in no acute distress


Denies cough, denies fever chills, denies hemoptysis


Good appetite


AFB 3 negative


A repeat CAT scan of the chest noted


Follow-up in the office


If there is no change in size of nodules will consider bronchoscopy biopsy or CT

-guided biopsy





Objective





- Vital Signs/Intake and Output


Vital Signs (last 24 hours): 


 











Temp Pulse Resp BP Pulse Ox


 


 98.9 F   106 H  20   121/79   96 


 


 12/13/17 09:07  12/13/17 09:07  12/13/17 09:07  12/13/17 09:07  12/13/17 09:07








Intake and Output: 


 











 12/13/17 12/13/17





 06:59 18:59


 


Intake Total 400 


 


Balance 400 














- Medications


Medications: 


 Current Medications





Ethambutol HCl (Myambutol)  1,200 mg PO DAILY [facility]


   Last Admin: 12/13/17 10:14 Dose:  1,200 mg


Isoniazid (Niazid)  300 mg PO DAILY [facility]


   Last Admin: 12/13/17 10:14 Dose:  300 mg


Lorazepam (Ativan)  0.5 mg PO Q6H PRN


   PRN Reason: Anxiety


Losartan Potassium (Cozaar)  50 mg PO DAILY [facility]


   Last Admin: 12/13/17 10:15 Dose:  50 mg


Pyrazinamide (Pyrazinamide)  1,500 mg PO DAILY [facility]


   Last Admin: 12/13/17 10:14 Dose:  1,500 mg


Pyridoxine HCl (Vitamin B6 50 Mg Tab)  50 mg PO DAILY [facility]


   Last Admin: 12/13/17 10:14 Dose:  50 mg


Rifampin (Rifampin Cap)  600 mg PO DAILY [facility]


   Last Admin: 12/13/17 10:14 Dose:  600 mg


Fluticasone/Salmeterol (Advair Diskus 250/50)  1 puff INH RQ12 [facility]


   Last Admin: 12/13/17 08:05 Dose:  1 puff











- Labs


Labs: 


 





 12/06/17 07:27 





 12/06/17 07:27 





 











PT  13.8 SECONDS (9.7-12.2)  H  10/30/17  12:59    


 


INR  1.2   10/30/17  12:59    


 


APTT  38 SECONDS (21-34)  H  10/30/17  12:59    














Assessment and Plan


(1) Tuberculosis


Status: Acute   





(2) Hemoptysis


Status: Acute

## 2017-12-13 NOTE — CP.PCM.PN
Subjective





- Date & Time of Evaluation


Date of Evaluation: 12/13/17


Time of Evaluation: 08:15





- Subjective


Subjective: 





Pt no complain; (+) min cough


NO CP, no SOB, no edema, no diarrhea, no n/v


(+) AFB negative x 3





Objective





- Vital Signs/Intake and Output


Vital Signs (last 24 hours): 


 











Temp Pulse Resp BP Pulse Ox


 


 97.8 F   74   20   113/71   95 


 


 12/12/17 23:20  12/12/17 23:20  12/12/17 23:20  12/12/17 23:20  12/12/17 23:20








Intake and Output: 


 











 12/13/17 12/13/17





 06:59 18:59


 


Intake Total 400 


 


Balance 400 














- Medications


Medications: 


 Current Medications





Ethambutol HCl (Myambutol)  1,200 mg PO DAILY [facility]


   Last Admin: 12/12/17 10:33 Dose:  1,200 mg


Isoniazid (Niazid)  300 mg PO DAILY [facility]


   Last Admin: 12/12/17 10:33 Dose:  300 mg


Lorazepam (Ativan)  0.5 mg PO Q6H PRN


   PRN Reason: Anxiety


Losartan Potassium (Cozaar)  50 mg PO DAILY [facility]


   Last Admin: 12/12/17 10:33 Dose:  50 mg


Pyrazinamide (Pyrazinamide)  1,500 mg PO DAILY [facility]


   Last Admin: 12/12/17 10:33 Dose:  1,500 mg


Pyridoxine HCl (Vitamin B6 50 Mg Tab)  50 mg PO DAILY [facility]


   Last Admin: 12/12/17 10:33 Dose:  50 mg


Rifampin (Rifampin Cap)  600 mg PO DAILY [facility]


   Last Admin: 12/12/17 10:33 Dose:  600 mg


Fluticasone/Salmeterol (Advair Diskus 250/50)  1 puff INH RQ12 [facility]


   Last Admin: 12/12/17 19:55 Dose:  1 puff











- Labs


Labs: 


 





 12/06/17 07:27 





 12/06/17 07:27 





 











PT  13.8 SECONDS (9.7-12.2)  H  10/30/17  12:59    


 


INR  1.2   10/30/17  12:59    


 


APTT  38 SECONDS (21-34)  H  10/30/17  12:59    














- Head Exam


Head Exam: NORMAL INSPECTION





- Eye Exam


Eye Exam: Normal appearance





- ENT Exam


ENT Exam: Mucous Membranes Moist





- Respiratory Exam


Respiratory Exam: Clear to Ausculation Bilateral.  absent: Rales, Rhonchi, 

Wheezes





- Cardiovascular Exam


Cardiovascular Exam: REGULAR RHYTHM, +S1, +S2.  absent: Gallop





- GI/Abdominal Exam


GI & Abdominal Exam: Soft, Normal Bowel Sounds.  absent: Tenderness





- Extremities Exam


Extremities Exam: Full ROM, Normal Capillary Refill.  absent: Calf Tenderness, 

Joint Swelling, Pedal Edema





Assessment and Plan





- Assessment and Plan (Free Text)


Assessment: 





PTB ew/ large tubrculoma??


HTN





D/C airborne isolation


Recall Pulmonary for ? bronchoscopy


For RIPE x 2 mths and then RI x 6 mths


Cont Losartan

## 2017-12-21 NOTE — CP.PCM.DIS
Provider





- Provider


Date of Admission: 


10/30/17 18:32


CC: Coughing out blood





63 y/o male with HTN and ? allegic Rhinitis. Patient has cough x > 3 mths. 

Refuse CXR c/o do not believed he has any ungproblem.


Patient 2 days noted coughing blood. He was sen in ER and noted ? lung mass and 

cavity.


Pt was admitted and place on isolation.


Attending physician: 


Turner Tariq MD





Time Spent in preparation of Discharge (in minutes): 15





Hospital Course





- Lab Results


Lab Results: 


 Micro Results





12/05/17 13:16   Other: Please Indicate   Mycobacterial Culture - Preliminary


12/12/17 09:45   Other: Please Indicate   Mycobacterial Culture - Preliminary


12/04/17 06:05   Other: Please Indicate   Mycobacterial Culture - Preliminary


12/11/17 08:47   Other: Please Indicate   Mycobacterial Culture - Preliminary


12/10/17 08:47   Other: Please Indicate   Mycobacterial Culture - Preliminary


12/09/17 09:28   Other: Please Indicate   Mycobacterial Culture - Preliminary


12/02/17 08:42   Other: Please Indicate   Mycobacterial Culture - Preliminary


11/25/17 10:18   Other: Please Indicate   Mycobacterial Culture - Preliminary


12/01/17 06:42   Other: Please Indicate   Mycobacterial Culture - Preliminary


12/08/17 11:55   Other: Please Indicate   Mycobacterial Culture - Preliminary


11/30/17 10:56   Other: Please Indicate   Mycobacterial Culture - Preliminary


11/27/17 10:04   Other: Please Indicate   Mycobacterial Culture - Final


11/26/17 10:18   Other: Please Indicate   Mycobacterial Culture - Final


11/24/17 09:34   Other: Please Indicate   Mycobacterial Culture - Final


11/22/17 09:23   Other: Please Indicate   Mycobacterial Culture - Final


12/13/17 06:00   Other: Please Indicate   Mycobacterial Culture - Preliminary


11/28/17 11:55   Other: Please Indicate   Mycobacterial Culture - Preliminary


11/20/17 Unknown   Other: Please Indicate   Mycobacterial Culture - Preliminary


11/19/17 Unknown   Other: Please Indicate   Mycobacterial Culture - Final


11/18/17 Unknown   Other: Please Indicate   Mycobacterial Culture - Final


11/15/17 08:08   Other: Please Indicate   Mycobacterial Culture - Final


11/13/17 09:49   Other: Please Indicate   Mycobacterial Culture - Final


11/11/17 11:36   Other: Please Indicate   Mycobacterial Culture - Final


11/10/17 09:37   Other: Please Indicate   Mycobacterial Culture - Final


11/06/17 09:11   Other: Please Indicate   Mycobacterial Culture - Final


11/08/17 07:49   Other: Please Indicate   Mycobacterial Culture - Final


10/31/17 09:47   Other: Please Indicate   Mycobacterial Culture - Final


10/31/17 09:10   Other: Please Indicate   Mycobacterial Culture - Final


10/31/17 01:17   Other: Please Indicate   Mycobacterial Culture - Final


10/30/17 12:40   Blood   Blood Culture - Final


                            NO GROWTH AFTER 5 DAYS


10/30/17 12:40   Blood   Gram Stain - Final


                            TEST NOT PERFORMED


10/30/17 13:10   Blood   Blood Culture - Final


                            NO GROWTH AFTER 5 DAYS


10/30/17 13:10   Blood   Gram Stain - Final


                            TEST NOT PERFORMED


10/30/17 Unknown   Urine   Urine Culture - Final


                                No Growth (<1,000 CFU/ML)





 Most Recent Lab Values











WBC  7.4 K/uL (4.8-10.8)   12/06/17  07:27    


 


RBC  4.52 Mil/uL (4.40-5.90)   12/06/17  07:27    


 


Hgb  13.4 g/dL (12.0-18.0)   12/06/17  07:27    


 


Hct  39.7 % (35.0-51.0)   12/06/17  07:27    


 


MCV  87.8 fL (80.0-94.0)   12/06/17  07:27    


 


MCH  29.6 pg (27.0-31.0)   12/06/17  07:27    


 


MCHC  33.7 g/dL (33.0-37.0)   12/06/17  07:27    


 


RDW  14.9 % (11.5-14.5)  H  12/06/17  07:27    


 


Plt Count  361 K/uL (130-400)   12/06/17  07:27    


 


MPV  7.3 fL (7.2-11.7)   12/06/17  07:27    


 


Neut % (Auto)  70.6 % (50.0-75.0)   11/28/17  07:18    


 


Lymph % (Auto)  13.4 % (20.0-40.0)  L  11/28/17  07:18    


 


Mono % (Auto)  11.2 % (0.0-10.0)  H  11/28/17  07:18    


 


Eos % (Auto)  3.9 % (0.0-4.0)   11/28/17  07:18    


 


Baso % (Auto)  0.9 % (0.0-2.0)   11/28/17  07:18    


 


Neut #  5.5 K/uL (1.8-7.0)   11/28/17  07:18    


 


Lymph #  1.1 K/uL (1.0-4.3)   11/28/17  07:18    


 


Mono #  0.9 K/uL (0.0-0.8)  H  11/28/17  07:18    


 


Eos #  0.3 K/uL (0.0-0.7)   11/28/17  07:18    


 


Baso #  0.1 K/uL (0.0-0.2)   11/28/17  07:18    


 


Neutrophils % (Manual)  88 % (50-75)  H  11/01/17  08:35    


 


Band Neutrophils %  1 % (0-2)   11/01/17  08:35    


 


Lymphocytes % (Manual)  7 % (20-40)  L  11/01/17  08:35    


 


Monocytes % (Manual)  3 % (0-10)   11/01/17  08:35    


 


Basophils % (Manual)  1 % (0-2)   11/01/17  08:35    


 


Platelet Estimate  Normal  (NORMAL)   11/01/17  08:35    


 


RBC Morphology  Normal   11/01/17  08:35    


 


PT  13.8 SECONDS (9.7-12.2)  H  10/30/17  12:59    


 


INR  1.2   10/30/17  12:59    


 


APTT  38 SECONDS (21-34)  H  10/30/17  12:59    


 


pO2  31 mm/Hg (30-55)   10/30/17  17:20    


 


VBG pH  7.36  (7.32-7.43)   10/30/17  17:20    


 


VBG pCO2  43 mmHg (40-60)   10/30/17  17:20    


 


VBG HCO3  22.8 mmol/L  10/30/17  17:20    


 


VBG Total CO2  25.6 mmol/L (22-28)   10/30/17  17:20    


 


VBG O2 Sat (Calc)  67.3 % (40-65)  H  10/30/17  17:20    


 


VBG Base Excess  -1.3 mmol/L (0.0-2.0)  L  10/30/17  17:20    


 


VBG Potassium  3.3 mmol/L (3.6-5.2)  L  10/30/17  17:20    


 


Sodium  138.0 mmol/l (132-148)   10/30/17  17:20    


 


Chloride  106.0 mmol/L ()   10/30/17  17:20    


 


Glucose  112 mg/dl ()  H  10/30/17  17:20    


 


Lactate  1.5 mmol/L (0.7-2.1)   10/30/17  17:20    


 


Sodium  139 mmol/L (132-148)   12/06/17  07:27    


 


Potassium  4.1 mmol/L (3.6-5.2)   12/06/17  07:27    


 


Chloride  102 mmol/L ()   12/06/17  07:27    


 


Carbon Dioxide  27 mmol/L (22-30)   12/06/17  07:27    


 


Anion Gap  14  (10-20)   12/06/17  07:27    


 


BUN  14 mg/dL (9-20)   12/06/17  07:27    


 


Creatinine  1.0 mg/dL (0.8-1.5)   12/06/17  07:27    


 


Est GFR ( Amer)  > 60   12/06/17  07:27    


 


Est GFR (Non-Af Amer)  > 60   12/06/17  07:27    


 


Random Glucose  98 mg/dL ()   12/06/17  07:27    


 


Uric Acid  9.2 mg/dL (3.5-8.5)  H  11/07/17  08:00    


 


Calcium  8.5 mg/dl (8.6-10.4)  L  12/06/17  07:27    


 


Phosphorus  1.4 mg/dL (2.5-4.5)  L  10/30/17  12:59    


 


Magnesium  2.0 mg/dL (1.6-2.3)   11/04/17  07:46    


 


Total Bilirubin  0.4 mg/dL (0.2-1.3)   12/06/17  07:27    


 


AST  31 U/L (17-59)   12/06/17  07:27    


 


ALT  37 U/L (21-72)   12/06/17  07:27    


 


Alkaline Phosphatase  86 U/L ()   12/06/17  07:27    


 


Troponin I  < 0.0120 ng/mL (0.00-0.120)   10/30/17  12:59    


 


NT-Pro-B Natriuret Pep  249 pg/mL (0-900)   10/30/17  12:59    


 


Total Protein  7.7 g/dL (6.3-8.3)   12/06/17  07:27    


 


Albumin  4.0 g/dL (3.5-5.0)   12/06/17  07:27    


 


Globulin  3.7 gm/dL (2.2-3.9)   12/06/17  07:27    


 


Albumin/Globulin Ratio  1.1  (1.0-2.1)   12/06/17  07:27    


 


Procalcitonin  0.56 NG/ML (0.19-0.49)  H  10/31/17  11:49    


 


Venous Blood Potassium  3.3 mmol/L (3.6-5.2)  L  10/30/17  17:20    


 


Urine Color  Straw  (YELLOW)   10/30/17  14:42    


 


Urine Clarity  Clear  (Clear)   10/30/17  14:42    


 


Urine pH  5.0  (5.0-8.0)   10/30/17  14:42    


 


Ur Specific Gravity  1.005  (1.003-1.030)   10/30/17  14:42    


 


Urine Protein  Negative mg/dL (NEGATIVE)   10/30/17  14:42    


 


Urine Glucose (UA)  Normal mg/dL (Normal)   10/30/17  14:42    


 


Urine Ketones  Negative mg/dL (NEGATIVE)   10/30/17  14:42    


 


Urine Blood  1+  (NEGATIVE)  H  10/30/17  14:42    


 


Urine Nitrate  Negative  (NEGATIVE)   10/30/17  14:42    


 


Urine Bilirubin  Negative  (NEGATIVE)   10/30/17  14:42    


 


Urine Urobilinogen  Normal mg/dL (0.2-1.0)   10/30/17  14:42    


 


Ur Leukocyte Esterase  Neg Isaiah/uL (Negative)   10/30/17  14:42    


 


Urine RBC (Auto)  1 /hpf (0-3)   10/30/17  14:42    


 


Calcium Oxalate Crystal  Occ /hpf (<OCC)  H  10/30/17  14:42    


 


Hyaline Casts  0-2 /lpf (0-2)   10/30/17  14:42    


 


HIV 1&2 Antibody Screen  Negative  (NEGATIVE)   11/19/17  07:29    


 


Influenza Typ A,B (EIA)  Negative for flu a/b  (NEGATIVE)   10/30/17  12:46    


 


H.influenzae Type B Ag  Not required  (NEGATIVE)   11/01/17  03:30    


 


Ur L.pneumophila Ag  Negative  (NEGATIVE)   10/31/17  11:49    


 


Mycoplasma pneumon IgG  2.02  (<=0.90)  H  10/31/17  11:46    


 


Mycoplasma pneumon IgM  28 U/mL (<770)   10/31/17  11:46    


 


N.meningitidis ACY/W135  Not required  (NEGATIVE)   11/01/17  03:30    


 


N.meningi B/E.coli K1 Ag  Not required  (NEGATIVE)   11/01/17  03:30    


 


Group B Strep Antigen  Not required  (NEGATIVE)   11/01/17  03:30    


 


S. pneumoniae Antigen  Negative  (NEGATIVE)   11/01/17  03:30    


 


TB Test (QFT) Nil  0.92 IU/mL  10/31/17  11:46    


 


TB Test Mitogen - Nil  0.56 IU/mL  10/31/17  11:46    


 


TB Test TB - Nil  0.02 IU/mL  10/31/17  11:46    


 


TB Test (QFT)  Negative  (Negative)   10/31/17  11:46    














- Hospital Course


Hospital Course: 





Pt admitted for Pneumonia and PTB. Patient place on 2 wks antibiotic and 4 

antiTB meds.


Pt sputum was (+) and persistent to about 5 wks.


His Chest CT showed ? lung mass and was referred to pulmonary and ID.


Pt also referred to [place] [facility].


Pt improve w/ meds but had persist AFB (+).


Isolation was stop when neg AFB x 3. Pt initially was for bronschopy but later 

change his mind.


Pt discharge markedly improve.





Discharge Exam





- Head Exam


Head Exam: NORMAL INSPECTION





- Eye Exam


Eye Exam: Normal appearance





- ENT Exam


ENT Exam: Mucous Membranes Moist





- Neck Exam


Neck exam: Full Rom, Normal Inspection





- Respiratory Exam


Respiratory Exam: Decreased Breath Sounds.  absent: Rales, Rhonchi, Wheezes, 

Respiratory Distress





- Cardiovascular Exam


Cardiovascular Exam: REGULAR RHYTHM, +S1, +S2.  absent: Gallop, JVD, Systolic 

Murmur





- GI/Abdominal Exam


GI & Abdominal Exam: Soft.  absent: Hernia, Rigid, Tenderness





- Extremities Exam


Extremities exam: full ROM, normal capillary refill, pedal pulses present





Discharge Plan





- Discharge Medications


Prescriptions: 


Ethambutol [Myambutol] 1,200 mg PO DAILY #30 tab


Isoniazid [Niazid] 300 mg PO DAILY #30 tab


Pyrazinamide 1,500 mg PO DAILY #30 tab


rifAMPin [Rifampin Cap] 600 mg PO DAILY #30 cap


Pyridoxine [Vitamin B6 50 mg Tab] 50 mg PO DAILY #30 tab





- Follow Up Plan


Condition: STABLE


Disposition: HOME/ ROUTINE


Instructions:  Pyridoxine (Vitamin B-6) (By mouth), Rifampin/Isoniazid (By mouth

), Ethambutol (By mouth), Isoniazid (By mouth), Tuberculosis (DC), Community 

Acquired Pneumonia (DC), Bacterial Pneumonia (DC)


Additional Instructions: 


PLEASE F/U WITH TB CLININC TOMORROW 


PLEASE F/U WITH DR. LAURA  OFFICE - CALL AN [facility] APPOINTMENT 


P[LEASE DO CT SCAN OF CHEST IN 3 MONTHS 


PLEASE CONTINUE MEDICATION AS PER MED. REC. 


( TB CLINIC WILL PROVIDE  YOUR MEDICATION )

## 2018-02-07 ENCOUNTER — HOSPITAL ENCOUNTER (EMERGENCY)
Dept: HOSPITAL 31 - C.ER | Age: 63
Discharge: HOME | End: 2018-02-07
Payer: MEDICAID

## 2018-02-07 VITALS — TEMPERATURE: 98.9 F | SYSTOLIC BLOOD PRESSURE: 139 MMHG | DIASTOLIC BLOOD PRESSURE: 90 MMHG

## 2018-02-07 VITALS — RESPIRATION RATE: 18 BRPM | HEART RATE: 91 BPM

## 2018-02-07 VITALS — OXYGEN SATURATION: 97 %

## 2018-02-07 DIAGNOSIS — R19.7: Primary | ICD-10-CM

## 2018-02-07 NOTE — C.PDOC
History Of Present Illness


63 y/o male with PMHx of TB presents to ED with complaints of loose stool for 5 

days. Patient states he has had x4 bowel movements since earlier this morning 

and it is interfering with work which prompted visit to ED. Patient reports he 

is compliant with chronic medication including isoniazid and rifampin since 

December. denies fever, abdominal pain, weakness, blood in stool, back pain, 

nausea, vomiting or any other complaints at this time. 


Time Seen by Provider: 02/07/18 11:41


Chief Complaint (Nursing): Abdominal Pain


History Per: Patient


History/Exam Limitations: no limitations


Onset/Duration Of Symptoms: Days


Current Symptoms Are (Timing): Still Present





Past Medical History


Reviewed: Historical Data, Nursing Documentation, Vital Signs


Vital Signs: 


 Last Vital Signs











Temp  98.9 F   02/07/18 12:47


 


Pulse  91 H  02/07/18 12:47


 


Resp  18   02/07/18 12:47


 


BP  139/90   02/07/18 12:47


 


Pulse Ox  99   02/07/18 12:47














- Medical History


PMH: Asthma, HTN


Surgical History: No Surg Hx


Family History: States: No Known Family Hx





- Social History


Hx Tobacco Use: No


Hx Alcohol Use: No


Hx Substance Use: No





- Immunization History


Hx Tetanus Toxoid Vaccination: No


Hx Influenza Vaccination: No


Hx Pneumococcal Vaccination: No





Review Of Systems


Constitutional: Negative for: Fever, Chills


Gastrointestinal: Positive for: Diarrhea.  Negative for: Nausea, Vomiting, 

Abdominal Pain


Skin: Negative for: Rash





Physical Exam





- Physical Exam


Appears: Non-toxic, No Acute Distress


Skin: Warm, Dry, No Rash


Head: Atraumatic, Normacephalic


Eye(s): bilateral: Normal Inspection, EOMI


Nose: Normal


Oral Mucosa: Moist


Neck: Normal ROM, Supple


Chest: Symmetrical


Cardiovascular: Rhythm Regular


Respiratory: Normal Breath Sounds, No Accessory Muscle Use, No Rales, No Rhonchi

, No Wheezing


Gastrointestinal/Abdominal: Soft, No Tenderness, No Guarding, No Rebound


Neurological/Psych: Oriented x3





ED Course And Treatment


O2 Sat by Pulse Oximetry: 97 (RA)


Pulse Ox Interpretation: Normal


Progress Note: Pt was offered labs, pt notes he wants rx for diarrhea. 

DIscussed signs and symtpoms of concern and if symtpoms persist or worsen to 

return to ER.  Case discussed with Dr Hall, agreed upon plan and treatment.





Disposition





- Disposition


Referrals: 


Turner Tariq MD [Staff Provider] - 


Disposition: HOME/ ROUTINE


Disposition Time: 12:33


Condition: STABLE


Additional Instructions: 


Follow up with primary medical doctor in 1-3 days without fail for further 

evaluation. Take medications as prescribed. Return to the emergency department 

at any time if symptoms persist or worsen.








Prescriptions: 


Loperamide HCl/Simethicone [Imodium Multi-Symptom Rel Cplt] 1 each PO DAILY #15 

tablet


Instructions:  Acute Diarrhea (ED)


Forms:  [facility] (English)





- Clinical Impression


Clinical Impression: 


 Diarrhea








- PA / NP / Resident Statement


MD/DO has reviewed & agrees with the documentation as recorded.





- Scribe Statement


The provider has reviewed the documentation as recorded by the Chari Goel





All medical record entries made by the Chari were at my direction and 

personally dictated by me. I have reviewed the chart and agree that the record 

accurately reflects my personal performance of the history, physical exam, 

medical decision making, and the department course for this patient. I have 

also personally directed, reviewed, and agree with the discharge instructions 

and disposition.

## 2018-02-15 ENCOUNTER — HOSPITAL ENCOUNTER (INPATIENT)
Dept: HOSPITAL 31 - C.ER | Age: 63
LOS: 7 days | Discharge: HOME | DRG: 372 | End: 2018-02-22
Attending: INTERNAL MEDICINE | Admitting: INTERNAL MEDICINE
Payer: MEDICAID

## 2018-02-15 VITALS — BODY MASS INDEX: 22.8 KG/M2

## 2018-02-15 DIAGNOSIS — I10: ICD-10-CM

## 2018-02-15 DIAGNOSIS — E87.1: ICD-10-CM

## 2018-02-15 DIAGNOSIS — J44.9: ICD-10-CM

## 2018-02-15 DIAGNOSIS — A04.72: Primary | ICD-10-CM

## 2018-02-15 LAB
ALBUMIN SERPL-MCNC: 3.1 G/DL (ref 3.5–5)
ALBUMIN/GLOB SERPL: 0.9 {RATIO} (ref 1–2.1)
ALT SERPL-CCNC: 31 U/L (ref 21–72)
AST SERPL-CCNC: 34 U/L (ref 17–59)
BACTERIA #/AREA URNS HPF: (no result) /[HPF]
BASE EXCESS BLDV CALC-SCNC: 1 MMOL/L (ref 0–2)
BASOPHILS # BLD AUTO: 0 K/UL (ref 0–0.2)
BASOPHILS NFR BLD: 0.3 % (ref 0–2)
BILIRUB UR-MCNC: NEGATIVE MG/DL
BUN SERPL-MCNC: 19 MG/DL (ref 9–20)
CALCIUM SERPL-MCNC: 7.9 MG/DL (ref 8.6–10.4)
EOSINOPHIL # BLD AUTO: 0 K/UL (ref 0–0.7)
EOSINOPHIL NFR BLD: 0.1 % (ref 0–4)
EOSINOPHIL NFR BLD: 1 % (ref 0–4)
ERYTHROCYTE [DISTWIDTH] IN BLOOD BY AUTOMATED COUNT: 13.9 % (ref 11.5–14.5)
GFR NON-AFRICAN AMERICAN: > 60
GLUCOSE UR STRIP-MCNC: NORMAL MG/DL
HGB BLD-MCNC: 12.9 G/DL (ref 12–18)
LEUKOCYTE ESTERASE UR-ACNC: (no result) LEU/UL
LIPASE: 76 U/L (ref 23–300)
LYMPHOCYTE: 7 % (ref 20–40)
LYMPHOCYTES # BLD AUTO: 0.7 K/UL (ref 1–4.3)
LYMPHOCYTES NFR BLD AUTO: 5.4 % (ref 20–40)
MCH RBC QN AUTO: 30.4 PG (ref 27–31)
MCHC RBC AUTO-ENTMCNC: 34.5 G/DL (ref 33–37)
MCV RBC AUTO: 88.1 FL (ref 80–94)
MONOCYTE: 13 % (ref 0–10)
MONOCYTES # BLD: 2 K/UL (ref 0–0.8)
MONOCYTES NFR BLD: 15.5 % (ref 0–10)
NEUTROPHILS # BLD: 10 K/UL (ref 1.8–7)
NEUTROPHILS NFR BLD AUTO: 76 % (ref 50–75)
NEUTROPHILS NFR BLD AUTO: 78.7 % (ref 50–75)
NEUTS BAND NFR BLD: 3 % (ref 0–2)
NRBC BLD AUTO-RTO: 0 % (ref 0–2)
PCO2 BLDV: 38 MMHG (ref 40–60)
PH BLDV: 7.43 [PH] (ref 7.32–7.43)
PH UR STRIP: 5 [PH] (ref 5–8)
PLATELET # BLD EST: NORMAL 10*3/UL
PLATELET # BLD: 321 K/UL (ref 130–400)
PMV BLD AUTO: 6.7 FL (ref 7.2–11.7)
PROT UR STRIP-MCNC: (no result) MG/DL
RBC # BLD AUTO: 4.25 MIL/UL (ref 4.4–5.9)
RBC # UR STRIP: (no result) /UL
SP GR UR STRIP: 1.02 (ref 1–1.03)
SQUAMOUS EPITHIAL: < 1 /HPF (ref 0–5)
TOTAL CELLS COUNTED BLD: 100
URINE NITRATE: NEGATIVE
UROBILINOGEN UR-MCNC: NORMAL MG/DL (ref 0.2–1)
VENOUS BLOOD GAS PO2: 17 MM/HG (ref 30–55)
WBC # BLD AUTO: 12.7 K/UL (ref 4.8–10.8)

## 2018-02-15 NOTE — CT
PROCEDURE:  CT Chest, Abdomen and Pelvis with intravenous contrast



HISTORY:

Diarrhea, abdominal pain. 



Relevant medical history: Tuberculosis. 



COMPARISON:

12/13/2017 CT chest



TECHNIQUE:

IV dose administered:  100 cc Omnipaque 300 



Radiation dose:



Total exam DLP = 401.92 mGy-cm.



This CT exam was performed using one or more of the following dose 

reduction techniques: Automated exposure control, adjustment of the 

mA and/or kV according to patient size, and/or use of iterative 

reconstruction technique.



FINDINGS:



CT CHEST WITH CONTRAST:



LUNGS:

Interval decrease in the size of the cavitary mass in the posterior 

segment of the right upper lobe.



Stable findings with respect of left upper lobe scarring, infiltrate 

and confluent densities. These occur both in the apex and posterior 

segment of the left upper lobe.



MEDIASTINUM:

Unremarkable. Normal caliber aorta and pulmonary arterial trunk. No 

aortic dissection. Normal size heart.



LYMPH NODES:

Unremarkable.



PLEURA:

Unremarkable. No pneumothorax. No pleural fluid.



BONES:

Unremarkable.



OTHER FINDINGS:

None.



CT ABDOMEN AND PELVIS:



LIVER:

Unremarkable. No gross lesion or ductal dilatation. 



GALLBLADDER AND BILE DUCTS:

Cholelithiasis without CT evidence of acute cholecystitis.  Similar 

findings identified on the prior CT of the thorax 12/03/2017. 



PANCREAS:

Unremarkable. No gross lesion or ductal dilatation.



SPLEEN:

Unremarkable. 



ADRENALS:

Unremarkable. No mass. 



KIDNEYS AND URETERS:

Unremarkable. No hydronephrosis. No solid mass. Multiple small renal 

cysts bilaterally. 



VASCULATURE:

Unremarkable. No aortic aneurysm. 



BOWEL:

Dilated small bowel primarily proximal and mid small bowel without a 

focal narrowing. The overall findings suggest enteritis.  

Incompletely early small bowel obstruction should also be considered.



Pan colitis with the greatest degree of severity in the ascending 

colon. Less pronounced changes identified in the left hemicolon 

including sigmoid and rectum. 



APPENDIX:

Normal appendix. 



PERITONEUM:

Unremarkable. No free fluid. No free air. 



LYMPH NODES:

Unremarkable. No enlarged lymph nodes. 



BLADDER:

Unremarkable. 



REPRODUCTIVE:

Unremarkable. 



BONES:

No acute fracture. 



OTHER FINDINGS:

None.



IMPRESSION:

1. Pan colitis, severe in the ascending colon with less pronounced 

changes in the left ivelisse colon, sigmoid and rectum.



2. Dilated proximal small bowel, distal loops are less dilated with 

no mechanical obstruction.  Findings are likely moderate enteritis. 



3. Cholelithiasis without CT evidence of acute cholecystitis. 



4. Interval improvement in pulmonary findings primarily affecting the 

right upper lobe.  Stable findings/scarring/ granulomatous changes 

left upper lobe.

## 2018-02-15 NOTE — C.PDOC
History Of Present Illness





DIARRHEA, ABD PAIN, GEN WEAKNESS, CHILLS, SUBJ FEVER X 2 WEEKS. MULT LOOSE BM, 

LAST PTA. NO BLOOD. +ABD BLOATING. NO COUGH, CP, SOB. CURRENTLY TX FOR TB. 

ADMITTED 12/2017 FOR TB. SEEN ER 2/7 FOR SAME. REFERRED FROM PMD TODAY FOR EVAL





EXAM


MOD DIST APPEARS ILL


HEENT ANICTERIC


LUNGS CTA B/L OCC EXP WHEEZE SPEAKING FULL SENTENCES NO RETRACTION


ABD MILD BLOATING SOFT NT ND NO R/G


REMAINDER NEG


History Per: Patient


History/Exam Limitations: no limitations


Onset/Duration Of Symptoms: Days


Current Symptoms Are (Timing): Still Present


Severity: Moderate





Past Medical History


Reviewed: Historical Data, Nursing Documentation, Vital Signs


Vital Signs: 


 Last Vital Signs











Temp  99.0 F   02/15/18 11:56


 


Pulse  91 H  02/15/18 11:56


 


Resp  18   02/15/18 11:56


 


BP  116/78   02/15/18 11:56


 


Pulse Ox  100   02/15/18 15:44














- Medical History


PMH: Asthma, HTN


Surgical History: No Surg Hx


Family History: States: No Known Family Hx





- Social History


Hx Tobacco Use: No


Hx Alcohol Use: No


Hx Substance Use: No





- Immunization History


Hx Tetanus Toxoid Vaccination: Yes


Hx Influenza Vaccination: Yes


Hx Pneumococcal Vaccination: Yes





Review Of Systems


Except As Marked, All Systems Reviewed And Found Negative.


Constitutional: Positive for: Fever (subjective fever), Chills, Weakness


Cardiovascular: Negative for: Chest Pain


Respiratory: Negative for: Cough, Shortness of Breath


Gastrointestinal: Positive for: Abdominal Pain, Diarrhea





Physical Exam





- Physical Exam


Appears: Non-toxic, Other (moderate distress,ill)


Skin: Normal Color, Warm


Eye(s): bilateral: Scleral Icterus


Respiratory: Normal Breath Sounds, No Accessory Muscle Use, No Rales, No Rhonchi

, Wheezing (occasional expiratory wheezing), Other (speaking full sentences, no 

retraction)


Gastrointestinal/Abdominal: Normal Exam, Soft, No Tenderness, No Distention, No 

Guarding, No Rebound, Other (mild bloating)


Neurological/Psych: Oriented x3, Normal Speech, Normal Motor, Normal Sensation





ED Course And Treatment





- Laboratory Results


Result Diagrams: 


 02/15/18 12:55





 02/15/18 12:55


O2 Sat by Pulse Oximetry: 100 (RA)


Pulse Ox Interpretation: Normal





- Radiology


CXR: Interpreted by Me


CXR Interpretation: Yes: No Acute Disease, Other (IMPROVED COMPARED TO PRIOR)





Progress





- Re-Evaluation


Re-evaluation Note: 





02/15/18 15:41


EXAM MILD IMPROVE COMPARED TO INITIAL





D/W DR DEMI XIE AWARE OF ER FINDINGS WILL ADMIT. CONSULT DR BAHENA


02/15/18 15:57


D/W DR MORALES C/F DR BAHENA. AWARE OF ER FINDINGS. DOES NOT ADVISE ABX @ THIS 

TIME.





- Data Reviewed


Data Reviewed: Lab, Diagnostic imaging, EKG, Old records





Medical Decision Making


Medical Decision Making: 


Plan:


--Labs


--CXR


--UA


--IV Fluids





Disposition


Counseled Patient/Family Regarding: Studies Performed, Diagnosis





- Disposition


Disposition: HOSPITALIZED


Disposition Time: 15:43


Condition: STABLE





- POA


Present On Arrival: Poor Glycemic Control





- Clinical Impression


Clinical Impression: 


 Colitis, Hyponatremia








- Scribe Statement


The provider has reviewed the documentation as recorded by the Marciaibe


Devon Dimas


Provider Attestation: 





All medical record entries made by the Scribe were at my direction and 

personally dictated by me. I have reviewed the chart and agree that the record 

accurately reflects my personal performance of the history, physical exam, 

medical decision making, and the department course for this patient. I have 

also personally directed, reviewed, and agree with the discharge instructions 

and disposition.





Decision To Admit





- Pt Status Changed To:


Hospital Disposition Of: Observation





- .


Bed Request Type: Regular


Admitting Physician: Turner Xie


Patient Diagnosis: 


 Colitis, Hyponatremia

## 2018-02-15 NOTE — RAD
HISTORY:

abd pain  



COMPARISON:

Chest x-ray performed 11/29/17, CT chest without contrast performed 

12/13/17 



TECHNIQUE:

Chest, one view.



FINDINGS:





LUNGS:

Interval decrease in upper lobe right greater than left confluent 

airspace disease. Right hilar prominence. 



Numerous bilateral small nodular pulmonary opacities. 



Please note that chest x-ray has limited sensitivity for the 

detection of pulmonary masses.



PLEURA:

No significant pleural effusion identified. No definite pneumothorax .



CARDIOVASCULAR:

Heart size appears top normal.



OSSEOUS STRUCTURES:

 Degenerative changes.



VISUALIZED UPPER ABDOMEN:

Unremarkable.



OTHER FINDINGS:

None.



IMPRESSION:

Interval decrease and upper lobe (right greater than left) confluent 

airspace disease. 



Numerous bilateral small nodular opacities. 



Right hilar prominence.

## 2018-02-16 LAB
ALBUMIN SERPL-MCNC: 2.8 G/DL (ref 3.5–5)
ALBUMIN/GLOB SERPL: 0.9 {RATIO} (ref 1–2.1)
ALT SERPL-CCNC: 42 U/L (ref 21–72)
AST SERPL-CCNC: 49 U/L (ref 17–59)
BASOPHILS # BLD AUTO: 0 K/UL (ref 0–0.2)
BASOPHILS NFR BLD: 0.3 % (ref 0–2)
BUN SERPL-MCNC: 17 MG/DL (ref 9–20)
CALCIUM SERPL-MCNC: 7.6 MG/DL (ref 8.6–10.4)
EOSINOPHIL # BLD AUTO: 0 K/UL (ref 0–0.7)
EOSINOPHIL NFR BLD: 0.1 % (ref 0–4)
ERYTHROCYTE [DISTWIDTH] IN BLOOD BY AUTOMATED COUNT: 13.8 % (ref 11.5–14.5)
GFR NON-AFRICAN AMERICAN: > 60
HGB BLD-MCNC: 12.2 G/DL (ref 12–18)
LYMPHOCYTES # BLD AUTO: 1.3 K/UL (ref 1–4.3)
LYMPHOCYTES NFR BLD AUTO: 10 % (ref 20–40)
MCH RBC QN AUTO: 30.6 PG (ref 27–31)
MCHC RBC AUTO-ENTMCNC: 35 G/DL (ref 33–37)
MCV RBC AUTO: 87.6 FL (ref 80–94)
MONOCYTES # BLD: 2.1 K/UL (ref 0–0.8)
MONOCYTES NFR BLD: 16.6 % (ref 0–10)
NEUTROPHILS # BLD: 9.4 K/UL (ref 1.8–7)
NEUTROPHILS NFR BLD AUTO: 73 % (ref 50–75)
NRBC BLD AUTO-RTO: 0 % (ref 0–2)
PLATELET # BLD: 318 K/UL (ref 130–400)
PMV BLD AUTO: 7 FL (ref 7.2–11.7)
RBC # BLD AUTO: 3.97 MIL/UL (ref 4.4–5.9)
WBC # BLD AUTO: 12.8 K/UL (ref 4.8–10.8)

## 2018-02-16 RX ADMIN — PANTOPRAZOLE SODIUM SCH MG: 40 TABLET, DELAYED RELEASE ORAL at 21:36

## 2018-02-16 NOTE — CP.PCM.CON
History of Present Illness





- History of Present Illness


History of Present Illness: 





ASked to see pt for diarrhea and nausea.


PMH: TB, asthma, HTN


Pt reports 2-3 weeks of diarrhea- mult, eatery.  Nausea and occ vomiting.  Mild 

abdom discomfort- variable.  Sl fever and chills.


Denies RB, antibiotics, travel.


Takes meds for TB.





Review of Systems





- Constitutional


Constitutional: Fatigue, Weight Loss, Weakness





- EENT


Eyes: absent: Photophobia


Nose/Mouth/Throat: absent: Throat Swelling





- Cardiovascular


Cardiovascular: absent: Chest Pain, Dyspnea





- Respiratory


Respiratory: absent: Hemoptysis, Wheezing





- Gastrointestinal


Gastrointestinal: Abdominal Pain, Bloating, Cramping, Diarrhea, Nausea, 

Vomiting.  absent: Constipation, Dysphagia, Heartburn, Hematemesis, Hematochezia

, Melena





- Genitourinary


Genitourinary: absent: Flank Pain





- Integumentary


Integumentary: absent: Rash, Jaundice





- Neurological


Neurological: absent: Convulsions





Past Patient History





- Infectious Disease


Hx of Infectious Diseases: None





- Past Social History


Smoking Status: Never Smoked





- CARDIAC


Hx Hypertension: Yes





- PULMONARY


Hx Asthma: Yes





- MUSCULOSKELETAL/RHEUMATOLOGICAL


Hx Falls: No





- PSYCHIATRIC


Hx Substance Use: No





- SURGICAL HISTORY


Hx Surgeries: No





- ANESTHESIA


Hx Anesthesia: No





Meds


Allergies/Adverse Reactions: 


 Allergies











Allergy/AdvReac Type Severity Reaction Status Date / Time


 


No Known Allergies Allergy   Verified 02/16/18 00:32














- Medications


Medications: 


 Current Medications





Ciprofloxacin (Cipro)  500 mg PO BID [facility]


   Last Admin: 02/15/18 21:12 Dose:  500 mg


Heparin Sodium (Porcine) (Heparin)  5,000 units SC Q8 [facility]


   Last Admin: 02/16/18 06:01 Dose:  5,000 units


Sodium Chloride (Sodium Chloride 0.9%)  1,000 mls @ 100 mls/hr IV .Q10H [facility]


   Last Admin: 02/15/18 22:19 Dose:  Not Given


Isoniazid (Niazid)  300 mg PO DAILY [facility]


Losartan Potassium (Cozaar)  50 mg PO DAILY [facility]


Pantoprazole Sodium (Protonix Ec Tab)  40 mg PO DAILY [facility]


Pyridoxine HCl (Vitamin B6 50 Mg Tab)  50 mg PO DAILY [facility]


Rifampin (Rifampin Cap)  600 mg PO DAILY [facility]











Physical Exam





- Constitutional


Appears: Non-toxic





- Neck Exam


Neck exam: Negative for: Tenderness





- Respiratory Exam


Respiratory Exam: Clear to Auscultation Bilateral





- Cardiovascular Exam


Cardiovascular Exam: RRR





- GI/Abdominal Exam


GI & Abdominal Exam: Normal Bowel Sounds, Tenderness.  absent: Guarding, Mass, 

Rebound


Additional comments: 





protuberant.





- Extremities Exam


Extremities exam: Negative for: calf tenderness





- Neurological Exam


Neurological exam: Alert, Oriented x3





- Psychiatric Exam


Psychiatric exam: Anxious, Normal Affect





Results





- Vital Signs


Recent Vital Signs: 


 Last Vital Signs











Temp  99.5 F   02/16/18 00:00


 


Pulse  76   02/16/18 00:00


 


Resp  20   02/16/18 00:00


 


BP  100/60   02/16/18 00:00


 


Pulse Ox  96   02/16/18 00:00














- Labs


Result Diagrams: 


 02/15/18 12:55





 02/15/18 12:55


Labs: 


 Laboratory Results - last 24 hr











  02/15/18 02/15/18 02/15/18





  12:55 12:55 13:05


 


WBC  12.7 H D  


 


RBC  4.25 L  


 


Hgb  12.9  


 


Hct  37.4  


 


MCV  88.1  


 


MCH  30.4  


 


MCHC  34.5  


 


RDW  13.9  


 


Plt Count  321  


 


MPV  6.7 L  


 


Neut % (Auto)  78.7 H  


 


Lymph % (Auto)  5.4 L  


 


Mono % (Auto)  15.5 H  


 


Eos % (Auto)  0.1  


 


Baso % (Auto)  0.3  


 


Neut # (Auto)  10.0 H  


 


Lymph # (Auto)  0.7 L  


 


Mono # (Auto)  2.0 H  


 


Eos # (Auto)  0.0  


 


Baso # (Auto)  0.0  


 


Neutrophils % (Manual)  76 H  


 


Band Neutrophils %  3 H  


 


Lymphocytes % (Manual)  7 L  


 


Monocytes % (Manual)  13 H  


 


Eosinophils % (Manual)  1  


 


Platelet Estimate  Normal  


 


pO2    17 L


 


VBG pH    7.43


 


VBG pCO2    38 L


 


VBG HCO3    23.8


 


VBG Total CO2    26.4


 


VBG O2 Sat (Calc)    32.5 L


 


VBG Base Excess    1.0


 


VBG Potassium    4.1


 


Glucose    119 H


 


Lactate    0.9


 


Sodium   126 L  130.0 L


 


Potassium   4.1 


 


Chloride   95 L  101.0


 


Carbon Dioxide   23 


 


Anion Gap   13 


 


BUN   19 


 


Creatinine   1.0 


 


Est GFR ( Amer)   > 60 


 


Est GFR (Non-Af Amer)   > 60 


 


Random Glucose   118 H 


 


Calcium   7.9 L 


 


Total Bilirubin   1.2 


 


AST   34 


 


ALT   31 


 


Alkaline Phosphatase   137 H D 


 


Total Protein   6.6 


 


Albumin   3.1 L D 


 


Globulin   3.6 


 


Albumin/Globulin Ratio   0.9 L 


 


Lipase   76 


 


Venous Blood Potassium    4.1


 


Urine Color   


 


Urine Clarity   


 


Urine pH   


 


Ur Specific Gravity   


 


Urine Protein   


 


Urine Glucose (UA)   


 


Urine Ketones   


 


Urine Blood   


 


Urine Nitrate   


 


Urine Bilirubin   


 


Urine Urobilinogen   


 


Ur Leukocyte Esterase   


 


Urine WBC (Auto)   


 


Urine RBC (Auto)   


 


Ur Squamous Epith Cells   


 


Urine Bacteria   


 


C. difficile Ag & Toxin   














  02/15/18 02/15/18 02/15/18





  13:23 19:00 Unknown


 


WBC   


 


RBC   


 


Hgb   


 


Hct   


 


MCV   


 


MCH   


 


MCHC   


 


RDW   


 


Plt Count   


 


MPV   


 


Neut % (Auto)   


 


Lymph % (Auto)   


 


Mono % (Auto)   


 


Eos % (Auto)   


 


Baso % (Auto)   


 


Neut # (Auto)   


 


Lymph # (Auto)   


 


Mono # (Auto)   


 


Eos # (Auto)   


 


Baso # (Auto)   


 


Neutrophils % (Manual)   


 


Band Neutrophils %   


 


Lymphocytes % (Manual)   


 


Monocytes % (Manual)   


 


Eosinophils % (Manual)   


 


Platelet Estimate   


 


pO2   


 


VBG pH   


 


VBG pCO2   


 


VBG HCO3   


 


VBG Total CO2   


 


VBG O2 Sat (Calc)   


 


VBG Base Excess   


 


VBG Potassium   


 


Glucose   


 


Lactate   


 


Sodium   


 


Potassium   


 


Chloride   


 


Carbon Dioxide   


 


Anion Gap   


 


BUN   


 


Creatinine   


 


Est GFR ( Amer)   


 


Est GFR (Non-Af Amer)   


 


Random Glucose   


 


Calcium   


 


Total Bilirubin   


 


AST   


 


ALT   


 


Alkaline Phosphatase   


 


Total Protein   


 


Albumin   


 


Globulin   


 


Albumin/Globulin Ratio   


 


Lipase   


 


Venous Blood Potassium   


 


Urine Color  Palak  


 


Urine Clarity  Clear  


 


Urine pH  5.0  


 


Ur Specific Gravity  1.025  


 


Urine Protein  1+ H  


 


Urine Glucose (UA)  Normal  


 


Urine Ketones  Negative  


 


Urine Blood  1+ H  


 


Urine Nitrate  Negative  


 


Urine Bilirubin  Negative  


 


Urine Urobilinogen  Normal  


 


Ur Leukocyte Esterase  Neg  


 


Urine WBC (Auto)  1  


 


Urine RBC (Auto)  3  


 


Ur Squamous Epith Cells  < 1  


 


Urine Bacteria  Rare  


 


C. difficile Ag & Toxin   Negative  Negative














Assessment & Plan


(1) Colitis


Assessment and Plan: 


Seen on CT.  Pt has diarrhea.  c difficile is negative.  Consider infectious, 

colitis.  Also enteritis on CT and sm bowel dilatation.  No h/o IBD in psat.MEds

- noted.


Status: Acute   





(2) Hyponatremia


Status: Acute   





(3) Diarrhea


Assessment and Plan: 


colitis, enteritis., as above.  WBC=12.


On cipro.  Consider change to IV cipro.  Consider flagyl.


Consider colonoscopy


Status: Acute   





(4) Tuberculosis


Assessment and Plan: 


On meds


Status: Acute

## 2018-02-16 NOTE — CP.PCM.HP
History of Present Illness





- History of Present Illness


History of Present Illness: 





63 y/o male with PTB, HTN. Patient has diarrhea, watery since 1/27.


He was  seen in ER c/o diarrhea & was told viral. 


Patient has persistent watery stool & was advise observation.





Present on Admission





- Present on Admission


Any Indicators Present on Admission: Yes


History of DVT/PE: No


History of Uncontrolled Diabetes: No


Urinary Catheter: No


Decubitus Ulcer Present: No





Review of Systems





- Review of Systems


Systems not reviewed;Unavailable: Acuity of Condition





- Constitutional


Constitutional: absent: Anorexia, Night Sweats, Sleep Apnea





- EENT


Eyes: absent: Itchy Eyes, Sees Flashes, Loss of Vision


Ears: absent: Decreased Hearing


Nose/Mouth/Throat: absent: Nasal Congestion, Nasal Discharge, Post Nasal Drip, 

Sinus Pressure, Bleeding Gums, Dysphagia, Hoarsness, Odynophagia





- Cardiovascular


Cardiovascular: absent: Chest Pain, Diaphoresis, Dyspnea, Irregular Heart Rhythm

, Leg Edema, Palpitations





- Respiratory


Respiratory: absent: Cough, Hemoptysis, Dyspnea on Exertion, Chest Congestion





- Gastrointestinal


Gastrointestinal: Diarrhea, Loose Stools.  absent: Abdominal Pain, Dyspepsia, 

Dysphagia, Heartburn, Hematochezia, Nausea, Vomiting





- Genitourinary


Genitourinary: absent: Difficulty Urinating, Dysuria, Hematuria





- Musculoskeletal


Musculoskeletal: absent: Abnormal Gait, Limited Range of Motion, Myalgias, Neck 

Pain





- Integumentary


Integumentary: absent: Alopecia, Lesions, New Lesions, Rash





Past Patient History





- Infectious Disease


Hx of Infectious Diseases: None





- Past Social History


Smoking Status: Never Smoked





- CARDIAC


Hx Hypertension: Yes





- PULMONARY


Hx Asthma: Yes





- MUSCULOSKELETAL/RHEUMATOLOGICAL


Hx Falls: No





- PSYCHIATRIC


Hx Substance Use: No





- SURGICAL HISTORY


Hx Surgeries: No





- ANESTHESIA


Hx Anesthesia: No





Meds


Allergies/Adverse Reactions: 


 Allergies











Allergy/AdvReac Type Severity Reaction Status Date / Time


 


No Known Allergies Allergy   Verified 02/16/18 00:32














Physical Exam





- Constitutional


Appears: Well





- Eye Exam


Eye Exam: Periorbital tenderness





- ENT Exam


ENT Exam: Mucous Membranes Moist





- Neck Exam


Neck exam: Positive for: Full Rom.  Negative for: Lymphadenopathy, Meningismus, 

Normal Inspection





- Respiratory Exam


Respiratory Exam: Decreased Breath Sounds, Wheezes.  absent: Rales, Rhonchi





- Cardiovascular Exam


Cardiovascular Exam: REGULAR RHYTHM, +S1, +S2.  absent: Gallop, JVD, Systolic 

Murmur





- GI/Abdominal Exam


GI & Abdominal Exam: Soft.  absent: Rebound, Tenderness





- Extremities Exam


Extremities exam: Positive for: calf tenderness, full ROM.  Negative for: joint 

swelling, normal capillary refill





Results





- Vital Signs


Recent Vital Signs: 





 Last Vital Signs











Temp  98.4 F   02/16/18 16:32


 


Pulse  60   02/16/18 16:32


 


Resp  20   02/16/18 16:32


 


BP  94/58 L  02/16/18 16:32


 


Pulse Ox  97   02/16/18 16:32














- Labs


Result Diagrams: 


 02/16/18 08:22





 02/16/18 08:22


Labs: 





 Laboratory Results - last 24 hr











  02/15/18 02/16/18 02/16/18





  19:00 08:22 08:22


 


WBC   12.8 H 


 


RBC   3.97 L 


 


Hgb   12.2 


 


Hct   34.8 L 


 


MCV   87.6 


 


MCH   30.6 


 


MCHC   35.0 


 


RDW   13.8 


 


Plt Count   318 


 


MPV   7.0 L 


 


Neut % (Auto)   73.0 


 


Lymph % (Auto)   10.0 L 


 


Mono % (Auto)   16.6 H 


 


Eos % (Auto)   0.1 


 


Baso % (Auto)   0.3 


 


Neut # (Auto)   9.4 H 


 


Lymph # (Auto)   1.3 


 


Mono # (Auto)   2.1 H 


 


Eos # (Auto)   0.0 


 


Baso # (Auto)   0.0 


 


Sodium    129 L


 


Potassium    3.9


 


Chloride    101


 


Carbon Dioxide    21 L


 


Anion Gap    11


 


BUN    17


 


Creatinine    1.0


 


Est GFR ( Amer)    > 60


 


Est GFR (Non-Af Amer)    > 60


 


Random Glucose    103


 


Calcium    7.6 L


 


Total Bilirubin    0.7


 


AST    49


 


ALT    42


 


Alkaline Phosphatase    131 H


 


Total Protein    6.0 L


 


Albumin    2.8 L


 


Globulin    3.2


 


Albumin/Globulin Ratio    0.9 L


 


C. difficile Ag & Toxin  Negative  














- EKG Data


EKG Interpreted by: Myself


Rate: Normal





Assessment & Plan





- Assessment and Plan (Free Text)


Assessment: 





Colitis likely infectious vs Inflammatory





On Cipro/ hydration


GI note appreciated

## 2018-02-17 LAB
ALBUMIN SERPL-MCNC: 2.7 G/DL (ref 3.5–5)
ALBUMIN/GLOB SERPL: 0.9 {RATIO} (ref 1–2.1)
ALT SERPL-CCNC: 82 U/L (ref 21–72)
AST SERPL-CCNC: 117 U/L (ref 17–59)
BUN SERPL-MCNC: 19 MG/DL (ref 9–20)
C DIFF DNA SPEC QL NAA+PROBE: NEGATIVE
CALCIUM SERPL-MCNC: 7.5 MG/DL (ref 8.6–10.4)
GFR NON-AFRICAN AMERICAN: > 60
WBC STL QL MICRO: NEGATIVE

## 2018-02-17 RX ADMIN — PANTOPRAZOLE SODIUM SCH MG: 40 TABLET, DELAYED RELEASE ORAL at 10:37

## 2018-02-17 NOTE — CP.PCM.PN
Subjective





- Date & Time of Evaluation


Date of Evaluation: 02/17/18


Time of Evaluation: 15:02





- Subjective


Subjective: 





S: c/o diarrhea. No fever.





Objective





- Vital Signs/Intake and Output


Vital Signs (last 24 hours): 


 











Temp Pulse Resp BP Pulse Ox


 


 98.6 F   65   20   100/64   98 


 


 02/17/18 08:08  02/17/18 08:08  02/17/18 08:08  02/17/18 08:08  02/17/18 14:00








Intake and Output: 


 











 02/17/18 02/17/18





 06:59 18:59


 


Intake Total 1800 


 


Balance 1800 














- Medications


Medications: 


 Current Medications





Ciprofloxacin (Cipro)  500 mg PO BID [facility]


   Last Admin: 02/17/18 10:37 Dose:  500 mg


Heparin Sodium (Porcine) (Heparin)  5,000 units SC Q8 [facility]


   Last Admin: 02/17/18 13:31 Dose:  5,000 units


Sodium Chloride (Sodium Chloride 0.9%)  1,000 mls @ 100 mls/hr IV .Q10H [facility]


   Last Admin: 02/17/18 13:29 Dose:  100 mls/hr


Isoniazid (Niazid)  300 mg PO DAILY [facility]


   Last Admin: 02/17/18 10:37 Dose:  300 mg


Losartan Potassium (Cozaar)  50 mg PO DAILY [facility]


   Last Admin: 02/17/18 10:36 Dose:  50 mg


Metronidazole (Flagyl)  500 mg PO TID [facility]


   Last Admin: 02/17/18 13:31 Dose:  500 mg


Pantoprazole Sodium (Protonix Ec Tab)  40 mg PO DAILY [facility]


   Last Admin: 02/17/18 10:37 Dose:  40 mg


Pyridoxine HCl (Vitamin B6 50 Mg Tab)  50 mg PO DAILY [facility]


   Last Admin: 02/17/18 10:36 Dose:  50 mg


Rifampin (Rifampin Cap)  600 mg PO DAILY [facility]


   Last Admin: 02/17/18 10:36 Dose:  600 mg


Fluticasone/Salmeterol (Advair Diskus 250/50)  1 puff INH RQ12 [facility]











- Labs


Labs: 


 





 02/16/18 08:22 





 02/17/18 08:26 











- Constitutional


Appears: No Acute Distress





- Head Exam


Head Exam: NORMAL INSPECTION





- Eye Exam


Eye Exam: Normal appearance





- ENT Exam


ENT Exam: Normal Exam





- Neck Exam


Neck Exam: Normal Inspection





- Respiratory Exam


Respiratory Exam: NORMAL BREATHING PATTERN





- Cardiovascular Exam


Cardiovascular Exam: REGULAR RHYTHM





- GI/Abdominal Exam


GI & Abdominal Exam: Soft, Tenderness





- Rectal Exam


Rectal Exam: Deferred





- Extremities Exam


Extremities Exam: Normal Inspection





Assessment and Plan


(1) Colitis


Status: Acute   





(2) Hyponatremia


Status: Acute   





(3) Tuberculosis


Status: Chronic   





- Assessment and Plan (Free Text)


Assessment: 





A/P: Continue medications.

## 2018-02-17 NOTE — CP.PCM.PN
Subjective





- Date & Time of Evaluation


Date of Evaluation: 02/17/18


Time of Evaluation: 12:08





- Subjective


Subjective: 





Patient has had six watery bowel movments so far today.  He denies having 

abdominal pain, rectal bleeding.





Objective





- Vital Signs/Intake and Output


Vital Signs (last 24 hours): 


 











Temp Pulse Resp BP Pulse Ox


 


 98.6 F   65   20   100/64   98 


 


 02/17/18 08:08  02/17/18 08:08  02/17/18 08:08  02/17/18 08:08  02/17/18 08:08








Intake and Output: 


 











 02/17/18 02/17/18





 06:59 18:59


 


Intake Total 1800 


 


Balance 1800 














- Medications


Medications: 


 Current Medications





Ciprofloxacin (Cipro)  500 mg PO BID [facility]


   Last Admin: 02/17/18 10:37 Dose:  500 mg


Heparin Sodium (Porcine) (Heparin)  5,000 units SC Q8 [facility]


   Last Admin: 02/17/18 05:45 Dose:  5,000 units


Sodium Chloride (Sodium Chloride 0.9%)  1,000 mls @ 100 mls/hr IV .Q10H [facility]


   Last Admin: 02/17/18 03:30 Dose:  100 mls/hr


Isoniazid (Niazid)  300 mg PO DAILY [facility]


   Last Admin: 02/17/18 10:37 Dose:  300 mg


Losartan Potassium (Cozaar)  50 mg PO DAILY [facility]


   Last Admin: 02/17/18 10:36 Dose:  50 mg


Metronidazole (Flagyl)  500 mg PO TID [facility]


   Last Admin: 02/17/18 10:37 Dose:  500 mg


Pantoprazole Sodium (Protonix Ec Tab)  40 mg PO DAILY [facility]


   Last Admin: 02/17/18 10:37 Dose:  40 mg


Pyridoxine HCl (Vitamin B6 50 Mg Tab)  50 mg PO DAILY [facility]


   Last Admin: 02/17/18 10:36 Dose:  50 mg


Rifampin (Rifampin Cap)  600 mg PO DAILY [facility]


   Last Admin: 02/17/18 10:36 Dose:  600 mg


Fluticasone/Salmeterol (Advair Diskus 250/50)  1 puff INH RQ12 [facility]











- Labs


Labs: 


 





 02/16/18 08:22 





 02/17/18 08:26 











- Constitutional


Appears: No Acute Distress





- Head Exam


Head Exam: ATRAUMATIC, NORMOCEPHALIC





- Eye Exam


Eye Exam: EOMI, PERRL





- Neck Exam


Neck Exam: absent: Lymphadenopathy, Thyromegaly





- Respiratory Exam


Respiratory Exam: NORMAL BREATHING PATTERN.  absent: Rales, Rhonchi, Wheezes





- Cardiovascular Exam


Cardiovascular Exam: REGULAR RHYTHM, +S1, +S2.  absent: Gallop, Rubs, Murmur





- GI/Abdominal Exam


GI & Abdominal Exam: Soft, Normal Bowel Sounds.  absent: Tenderness, Mass, 

Organomegaly





- Rectal Exam


Rectal Exam: Deferred





- Extremities Exam


Extremities Exam: absent: Calf Tenderness, Pedal Edema





Assessment and Plan


(1) Diarrhea


Assessment & Plan: 


Diarrhea persists.  Stool analysis so far showed negative O+P and negative C 

difficile toxin RAUL.  Will check additional stool studies.  Consider 

colonoscopy.


Status: Acute

## 2018-02-18 RX ADMIN — FLUTICASONE PROPIONATE AND SALMETEROL SCH PUFF: 50; 250 POWDER RESPIRATORY (INHALATION) at 19:11

## 2018-02-18 RX ADMIN — FLUTICASONE PROPIONATE AND SALMETEROL SCH PUFF: 50; 250 POWDER RESPIRATORY (INHALATION) at 11:04

## 2018-02-18 RX ADMIN — PANTOPRAZOLE SODIUM SCH MG: 40 TABLET, DELAYED RELEASE ORAL at 10:15

## 2018-02-18 NOTE — CP.PCM.PN
Subjective





- Date & Time of Evaluation


Date of Evaluation: 02/18/18


Time of Evaluation: 12:00





- Subjective


Subjective: 





Patient continues to complain of diarrhea, five times this morning.  He denies 

having nausea, vomiting, abdominal pain.





Objective





- Vital Signs/Intake and Output


Vital Signs (last 24 hours): 


 











Temp Pulse Resp BP Pulse Ox


 


 99.2 F   63   20   96/58 L  97 


 


 02/18/18 08:30  02/18/18 08:30  02/18/18 08:30  02/18/18 08:30  02/18/18 08:30








Intake and Output: 


 











 02/18/18 02/18/18





 06:59 18:59


 


Intake Total 1960 


 


Balance 1960 














- Medications


Medications: 


 Current Medications





Ciprofloxacin (Cipro)  500 mg PO BID [facility]


   Last Admin: 02/18/18 10:14 Dose:  500 mg


Heparin Sodium (Porcine) (Heparin)  5,000 units SC Q8 [facility]


   Last Admin: 02/18/18 05:45 Dose:  5,000 units


Sodium Chloride (Sodium Chloride 0.9%)  1,000 mls @ 100 mls/hr IV .Q10H [facility]


   Last Admin: 02/18/18 10:14 Dose:  100 mls/hr


Isoniazid (Niazid)  300 mg PO DAILY [facility]


   Last Admin: 02/18/18 10:14 Dose:  300 mg


Losartan Potassium (Cozaar)  50 mg PO DAILY [facility]


   Last Admin: 02/18/18 10:15 Dose:  Not Given


Metronidazole (Flagyl)  500 mg PO TID [facility]


   Last Admin: 02/18/18 10:14 Dose:  500 mg


Pantoprazole Sodium (Protonix Ec Tab)  40 mg PO DAILY [facility]


   Last Admin: 02/18/18 10:15 Dose:  40 mg


Pyridoxine HCl (Vitamin B6 50 Mg Tab)  50 mg PO DAILY [facility]


   Last Admin: 02/18/18 10:14 Dose:  50 mg


Rifampin (Rifampin Cap)  600 mg PO DAILY [facility]


   Last Admin: 02/18/18 10:14 Dose:  600 mg


Fluticasone/Salmeterol (Advair Diskus 250/50)  1 puff INH RQ12 [facility]


   Last Admin: 02/18/18 11:04 Dose:  1 puff











- Labs


Labs: 


 





 02/16/18 08:22 





 02/17/18 08:26 











- Constitutional


Appears: No Acute Distress





- Head Exam


Head Exam: ATRAUMATIC, NORMOCEPHALIC





- Eye Exam


Eye Exam: EOMI, PERRL





- Neck Exam


Neck Exam: absent: Lymphadenopathy, Thyromegaly





- Respiratory Exam


Respiratory Exam: NORMAL BREATHING PATTERN.  absent: Rales, Rhonchi, Wheezes





- Cardiovascular Exam


Cardiovascular Exam: REGULAR RHYTHM, +S1, +S2.  absent: Gallop, Rubs, Murmur





- GI/Abdominal Exam


GI & Abdominal Exam: Soft, Normal Bowel Sounds.  absent: Tenderness, Mass, 

Organomegaly





- Rectal Exam


Rectal Exam: Deferred





- Extremities Exam


Extremities Exam: absent: Calf Tenderness, Pedal Edema





Assessment and Plan


(1) Diarrhea


Assessment & Plan: 


Diarrhea persists.  The stool analyses are negative: leukocytes, C difficile 

toxin, O+P, C+S.  Giardia antigen is pending.  Consider colonoscopy.


Status: Acute

## 2018-02-18 NOTE — CP.PCM.PN
Subjective





- Date & Time of Evaluation


Date of Evaluation: 02/18/18


Time of Evaluation: 10:59





- Subjective


Subjective: 





S: C/o abdominal pain and diarrhea. C/o  anxious about  going  home and 

inability of tuberculosis medication.  





Objective





- Vital Signs/Intake and Output


Vital Signs (last 24 hours): 


 











Temp Pulse Resp BP Pulse Ox


 


 99.2 F   63   20   96/58 L  97 


 


 02/18/18 08:30  02/18/18 08:30  02/18/18 08:30  02/18/18 08:30  02/18/18 08:30








Intake and Output: 


 











 02/18/18 02/18/18





 06:59 18:59


 


Intake Total 1960 


 


Balance 1960 














- Medications


Medications: 


 Current Medications





Ciprofloxacin (Cipro)  500 mg PO BID [facility]


   Last Admin: 02/18/18 10:14 Dose:  500 mg


Heparin Sodium (Porcine) (Heparin)  5,000 units SC Q8 [facility]


   Last Admin: 02/18/18 05:45 Dose:  5,000 units


Sodium Chloride (Sodium Chloride 0.9%)  1,000 mls @ 100 mls/hr IV .Q10H [facility]


   Last Admin: 02/18/18 10:14 Dose:  100 mls/hr


Isoniazid (Niazid)  300 mg PO DAILY [facility]


   Last Admin: 02/18/18 10:14 Dose:  300 mg


Losartan Potassium (Cozaar)  50 mg PO DAILY [facility]


   Last Admin: 02/18/18 10:15 Dose:  Not Given


Metronidazole (Flagyl)  500 mg PO TID [facility]


   Last Admin: 02/18/18 10:14 Dose:  500 mg


Pantoprazole Sodium (Protonix Ec Tab)  40 mg PO DAILY [facility]


   Last Admin: 02/18/18 10:15 Dose:  40 mg


Pyridoxine HCl (Vitamin B6 50 Mg Tab)  50 mg PO DAILY [facility]


   Last Admin: 02/18/18 10:14 Dose:  50 mg


Rifampin (Rifampin Cap)  600 mg PO DAILY [facility]


   Last Admin: 02/18/18 10:14 Dose:  600 mg


Fluticasone/Salmeterol (Advair Diskus 250/50)  1 puff INH RQ12 [facility]











- Labs


Labs: 


 





 02/16/18 08:22 





 02/17/18 08:26 











- Constitutional


Appears: No Acute Distress





- Head Exam


Head Exam: NORMAL INSPECTION





- Eye Exam


Eye Exam: Normal appearance





- ENT Exam


ENT Exam: Mucous Membranes Moist





- Neck Exam


Neck Exam: Normal Inspection





- Respiratory Exam


Respiratory Exam: NORMAL BREATHING PATTERN





- Cardiovascular Exam


Cardiovascular Exam: REGULAR RHYTHM





- GI/Abdominal Exam


GI & Abdominal Exam: Soft





- Rectal Exam


Rectal Exam: Deferred





- Extremities Exam


Extremities Exam: Normal Inspection





- Neurological Exam


Neurological Exam: Alert





Assessment and Plan


(1) Colitis


Status: Acute   





(2) Hyponatremia


Status: Acute   





(3) Tuberculosis


Status: Chronic   





- Assessment and Plan (Free Text)


Assessment: 





A/P: Continue medications. Check stool cultures. Social service  for  discharge 

planning

## 2018-02-19 VITALS — RESPIRATION RATE: 20 BRPM

## 2018-02-19 PROCEDURE — 0DBM8ZX EXCISION OF DESCENDING COLON, VIA NATURAL OR ARTIFICIAL OPENING ENDOSCOPIC, DIAGNOSTIC: ICD-10-PCS | Performed by: INTERNAL MEDICINE

## 2018-02-19 PROCEDURE — 0DBP8ZX EXCISION OF RECTUM, VIA NATURAL OR ARTIFICIAL OPENING ENDOSCOPIC, DIAGNOSTIC: ICD-10-PCS | Performed by: INTERNAL MEDICINE

## 2018-02-19 PROCEDURE — 0DBL8ZX EXCISION OF TRANSVERSE COLON, VIA NATURAL OR ARTIFICIAL OPENING ENDOSCOPIC, DIAGNOSTIC: ICD-10-PCS | Performed by: INTERNAL MEDICINE

## 2018-02-19 PROCEDURE — 0DBK8ZX EXCISION OF ASCENDING COLON, VIA NATURAL OR ARTIFICIAL OPENING ENDOSCOPIC, DIAGNOSTIC: ICD-10-PCS | Performed by: INTERNAL MEDICINE

## 2018-02-19 PROCEDURE — 0DBH8ZX EXCISION OF CECUM, VIA NATURAL OR ARTIFICIAL OPENING ENDOSCOPIC, DIAGNOSTIC: ICD-10-PCS | Performed by: INTERNAL MEDICINE

## 2018-02-19 RX ADMIN — FLUTICASONE PROPIONATE AND SALMETEROL SCH PUFF: 50; 250 POWDER RESPIRATORY (INHALATION) at 09:25

## 2018-02-19 RX ADMIN — VANCOMYCIN HYDROCHLORIDE SCH MG: 500 INJECTION, POWDER, LYOPHILIZED, FOR SOLUTION INTRAVENOUS at 17:36

## 2018-02-19 RX ADMIN — PANTOPRAZOLE SODIUM SCH: 40 TABLET, DELAYED RELEASE ORAL at 10:10

## 2018-02-19 RX ADMIN — FLUTICASONE PROPIONATE AND SALMETEROL SCH PUFF: 50; 250 POWDER RESPIRATORY (INHALATION) at 19:18

## 2018-02-19 RX ADMIN — VANCOMYCIN HYDROCHLORIDE SCH MG: 500 INJECTION, POWDER, LYOPHILIZED, FOR SOLUTION INTRAVENOUS at 21:34

## 2018-02-19 NOTE — CP.PCM.PN
Subjective





- Date & Time of Evaluation


Date of Evaluation: 02/19/18


Time of Evaluation: 08:32





- Subjective


Subjective: 





Pt no complai exc diarrhea and dec appetite


No CP, no SOB, no edema, no cough





Objective





- Vital Signs/Intake and Output


Vital Signs (last 24 hours): 


 











Temp Pulse Resp BP Pulse Ox


 


 98.2 F   65   20   98/62 L  99 


 


 02/19/18 08:16  02/19/18 08:16  02/19/18 08:16  02/19/18 08:16  02/19/18 08:16








Intake and Output: 


 











 02/19/18 02/19/18





 06:59 18:59


 


Intake Total 3900 


 


Balance 3900 














- Medications


Medications: 


 Current Medications





Ciprofloxacin (Cipro)  [facility] mg [facility]ID [facility]


   Last Admin: 02/18/18 18:10 Dose:  500 mg


Heparin Sodium (Porcine) (Heparin)  5,000 units SC Q8 [facility]


   Last Admin: 02/19/18 05:45 Dose:  5,000 units


Sodium Chloride (Sodium Chloride 0.9%)  1,000 mls @ 100 mls/hr IV .Q10H [facility]


   Last Admin: 02/19/18 07:51 Dose:  Not Given


Isoniazid (Niazid)  300 mg PO DAILY [facility]


   Last Admin: 02/18/18 10:14 Dose:  300 mg


Losartan Potassium (Cozaar)  50 mg PO DAILY [facility]


   Last Admin: 02/18/18 10:15 Dose:  Not Given


Metronidazole (Flagyl)  500 mg PO TID [facility]


   Last Admin: 02/18/18 18:10 Dose:  500 mg


Pantoprazole Sodium (Protonix Ec Tab)  40 mg PO DAILY [facility]


   Last Admin: 02/18/18 10:15 Dose:  40 mg


Pyridoxine HCl (Vitamin B6 50 Mg Tab)  50 mg PO DAILY [facility]


   Last Admin: 02/18/18 10:14 Dose:  50 mg


Rifampin (Rifampin Cap)  600 mg PO DAILY [facility]


   Last Admin: 02/18/18 10:14 Dose:  600 mg


Fluticasone/Salmeterol (Advair Diskus 250/50)  1 puff INH RQ12 [facility]


   Last Admin: 02/18/18 19:11 Dose:  1 puff











- Labs


Labs: 


 





 02/16/18 08:22 





 02/17/18 08:26 











- Constitutional


Appears: Older Than Stated Age





- Eye Exam


Eye Exam: Normal appearance





- ENT Exam


ENT Exam: Mucous Membranes Moist





- Neck Exam


Neck Exam: Full ROM.  absent: Lymphadenopathy, Thyromegaly





- Respiratory Exam


Respiratory Exam: Decreased Breath Sounds.  absent: Rales, Rhonchi, Wheezes





- Cardiovascular Exam


Cardiovascular Exam: +S1, +S2.  absent: Gallop, REGULAR RHYTHM, JVD





- GI/Abdominal Exam


GI & Abdominal Exam: Soft.  absent: Tenderness, Mass





- Extremities Exam


Extremities Exam: Full ROM, Normal Capillary Refill.  absent: Calf Tenderness, 

Joint Swelling, Pedal Edema





Assessment and Plan





- Assessment and Plan (Free Text)


Assessment: 





Diarrhea; PTB


HTN





Conrt meds


For colonoscopy

## 2018-02-20 LAB
ALBUMIN SERPL-MCNC: 2.4 G/DL (ref 3.5–5)
ALBUMIN/GLOB SERPL: 0.8 {RATIO} (ref 1–2.1)
ALT SERPL-CCNC: 157 U/L (ref 21–72)
AST SERPL-CCNC: 71 U/L (ref 17–59)
BASOPHILS # BLD AUTO: 0.1 K/UL (ref 0–0.2)
BASOPHILS NFR BLD: 0.6 % (ref 0–2)
BUN SERPL-MCNC: 8 MG/DL (ref 9–20)
CALCIUM SERPL-MCNC: 7.3 MG/DL (ref 8.6–10.4)
EOSINOPHIL # BLD AUTO: 0.1 K/UL (ref 0–0.7)
EOSINOPHIL NFR BLD: 1.4 % (ref 0–4)
ERYTHROCYTE [DISTWIDTH] IN BLOOD BY AUTOMATED COUNT: 13.4 % (ref 11.5–14.5)
GFR NON-AFRICAN AMERICAN: > 60
HGB BLD-MCNC: 12.4 G/DL (ref 12–18)
LYMPHOCYTES # BLD AUTO: 1.3 K/UL (ref 1–4.3)
LYMPHOCYTES NFR BLD AUTO: 15.2 % (ref 20–40)
MCH RBC QN AUTO: 30.1 PG (ref 27–31)
MCHC RBC AUTO-ENTMCNC: 34.2 G/DL (ref 33–37)
MCV RBC AUTO: 87.9 FL (ref 80–94)
MONOCYTES # BLD: 1 K/UL (ref 0–0.8)
MONOCYTES NFR BLD: 11.8 % (ref 0–10)
NEUTROPHILS # BLD: 6.1 K/UL (ref 1.8–7)
NEUTROPHILS NFR BLD AUTO: 71 % (ref 50–75)
NRBC BLD AUTO-RTO: 0 % (ref 0–2)
PLATELET # BLD: 471 K/UL (ref 130–400)
PMV BLD AUTO: 7 FL (ref 7.2–11.7)
RBC # BLD AUTO: 4.11 MIL/UL (ref 4.4–5.9)
WBC # BLD AUTO: 8.7 K/UL (ref 4.8–10.8)

## 2018-02-20 RX ADMIN — VANCOMYCIN HYDROCHLORIDE SCH MG: 500 INJECTION, POWDER, LYOPHILIZED, FOR SOLUTION INTRAVENOUS at 10:40

## 2018-02-20 RX ADMIN — VANCOMYCIN HYDROCHLORIDE SCH MG: 500 INJECTION, POWDER, LYOPHILIZED, FOR SOLUTION INTRAVENOUS at 18:16

## 2018-02-20 RX ADMIN — VANCOMYCIN HYDROCHLORIDE SCH MG: 500 INJECTION, POWDER, LYOPHILIZED, FOR SOLUTION INTRAVENOUS at 22:06

## 2018-02-20 RX ADMIN — VANCOMYCIN HYDROCHLORIDE SCH MG: 500 INJECTION, POWDER, LYOPHILIZED, FOR SOLUTION INTRAVENOUS at 14:53

## 2018-02-20 NOTE — CP.PCM.PN
Subjective





- Date & Time of Evaluation


Date of Evaluation: 02/20/18


Time of Evaluation: 08:26





- Subjective


Subjective: 





F/u diarrhea


Pt reports feeling much better.


Less diarrhea


No abdom pain


Denies RB, fever, chills, CP, SOB, HA , cough


He wants solid food





Objective





- Vital Signs/Intake and Output


Vital Signs (last 24 hours): 


 











Temp Pulse Resp BP Pulse Ox


 


 98.6 F   70   20   109/66   98 


 


 02/20/18 08:00  02/20/18 08:00  02/20/18 08:00  02/20/18 08:00  02/20/18 08:00








Intake and Output: 


 











 02/20/18 02/20/18





 06:59 18:59


 


Intake Total 800 1040


 


Balance 800 1040














- Medications


Medications: 


 Current Medications





Ciprofloxacin (Cipro)  500 mg PO BID [facility]


   Last Admin: 02/19/18 17:34 Dose:  500 mg


Heparin Sodium (Porcine) (Heparin)  5,000 units SC Q8 [facility]


   Last Admin: 02/20/18 05:07 Dose:  5,000 units


Sodium Chloride (Sodium Chloride 0.9%)  1,000 mls @ 100 mls/hr IV .Q10H [facility]


   Last Admin: 02/20/18 02:18 Dose:  100 mls/hr


Isoniazid (Niazid)  300 mg PO DAILY [facility]


   Last Admin: 02/19/18 10:09 Dose:  Not Given


Losartan Potassium (Cozaar)  50 mg PO DAILY [facility]


   Last Admin: 02/19/18 10:07 Dose:  Not Given


Metronidazole (Flagyl)  500 mg PO TID [facility]


   Last Admin: 02/19/18 17:35 Dose:  500 mg


Pantoprazole Sodium (Protonix Ec Tab)  40 mg PO DAILY [facility]


   Last Admin: 02/19/18 10:10 Dose:  Not Given


Pyridoxine HCl (Vitamin B6 50 Mg Tab)  50 mg PO DAILY [facility]


   Last Admin: 02/19/18 10:10 Dose:  Not Given


Rifampin (Rifampin Cap)  600 mg PO DAILY [facility]


   Last Admin: 02/19/18 10:10 Dose:  Not Given


Fluticasone/Salmeterol (Advair Diskus 250/50)  1 puff INH RQ12 [facility]


   Last Admin: 02/19/18 19:18 Dose:  1 puff


Vancomycin HCl (Vancocin (Oral Or Rectal Use))  125 mg PO QID [facility]


   Last Admin: 02/19/18 21:34 Dose:  125 mg











- Labs


Labs: 


 





 02/20/18 07:06 





 02/20/18 07:06 











- Constitutional


Appears: Well





- Neck Exam


Neck Exam: absent: Tenderness





- Respiratory Exam


Respiratory Exam: Clear to Ausculation Bilateral





- Cardiovascular Exam


Cardiovascular Exam: RRR





- GI/Abdominal Exam


GI & Abdominal Exam: Soft, Normal Bowel Sounds.  absent: Guarding, Rigid, 

Tenderness, Mass, Rebound





- Neurological Exam


Neurological Exam: Alert, Oriented x3





Assessment and Plan


(1) Colitis


Assessment & Plan: 


likely due to pseudomembranous colitis


Status: Acute   





(2) Hyponatremia


Status: Acute   





(3) Diarrhea


Assessment & Plan: 


due tp pseudom colitis-  seen on colonsocopy.  Even though stool c difficile is 

negative.  


Rec:  Oral vanco


stop cipro


change protonix to pepcid


Status: Acute   





(4) Tuberculosis


Status: Chronic   





(5) Abnormal LFTs


Assessment & Plan: 


consider meds.


Rec- stop cipro


check labs, hep profile.


Status: Acute

## 2018-02-20 NOTE — CP.PCM.PN
Subjective





- Date & Time of Evaluation


Date of Evaluation: 02/20/18


Time of Evaluation: 08:35





- Subjective


Subjective: 





Pt feels hungry. No CP, no SOB, no edema, no cough





Objective





- Vital Signs/Intake and Output


Vital Signs (last 24 hours): 


 











Temp Pulse Resp BP Pulse Ox


 


 98.6 F   70   20   109/66   98 


 


 02/20/18 08:00  02/20/18 08:00  02/20/18 08:00  02/20/18 08:00  02/20/18 08:00








Intake and Output: 


 











 02/20/18 02/20/18





 06:59 18:59


 


Intake Total 800 1040


 


Balance 800 1040














- Medications


Medications: 


 Current Medications





Heparin Sodium (Porcine) (Heparin)  5,000 units SC Q8 [facility]


   Last Admin: 02/20/18 05:07 Dose:  5,000 units


Sodium Chloride (Sodium Chloride 0.9%)  1,000 mls @ 100 mls/hr IV .Q10H [facility]


   Last Admin: 02/20/18 02:18 Dose:  100 mls/hr


Isoniazid (Niazid)  300 mg PO DAILY [facility]


   Last Admin: 02/19/18 10:09 Dose:  Not Given


Losartan Potassium (Cozaar)  50 mg PO DAILY [facility]


   Last Admin: 02/19/18 10:07 Dose:  Not Given


Metronidazole (Flagyl)  500 mg PO TID [facility]


   Last Admin: 02/19/18 17:35 Dose:  500 mg


Pyridoxine HCl (Vitamin B6 50 Mg Tab)  50 mg PO DAILY [facility]


   Last Admin: 02/19/18 10:10 Dose:  Not Given


Rifampin (Rifampin Cap)  600 mg PO DAILY [facility]


   Last Admin: 02/19/18 10:10 Dose:  Not Given


Fluticasone/Salmeterol (Advair Diskus 250/50)  1 puff INH RQ12 [facility]


   Last Admin: 02/19/18 19:18 Dose:  1 puff


Vancomycin HCl (Vancocin (Oral Or Rectal Use))  125 mg PO QID [facility]


   Last Admin: 02/19/18 21:34 Dose:  125 mg











- Labs


Labs: 


 





 02/20/18 07:06 





 02/20/18 07:06 











- Constitutional


Appears: No Acute Distress





- Eye Exam


Eye Exam: Normal appearance





- ENT Exam


ENT Exam: Mucous Membranes Moist





- Neck Exam


Neck Exam: Full ROM.  absent: Lymphadenopathy, Tenderness





- Respiratory Exam


Respiratory Exam: absent: Decreased Breath Sounds, Rales, Rhonchi, Wheezes





- Cardiovascular Exam


Cardiovascular Exam: REGULAR RHYTHM, +S1, +S2.  absent: Gallop, JVD





- GI/Abdominal Exam


GI & Abdominal Exam: Soft.  absent: Tenderness, Mass





- Extremities Exam


Extremities Exam: Calf Tenderness, Full ROM, Normal Capillary Refill.  absent: 

Joint Swelling, Pedal Edema





Assessment and Plan





- Assessment and Plan (Free Text)


Assessment: 





Pulm TB (on 4th mth of tx)


C diff colitis - most likely


HTN





Dr Flores note appreciated.


Stop Cipro and PPI


Inc diet


need to contact TB clinic c/o develop C diff on RI regimen

## 2018-02-21 LAB
ALBUMIN SERPL-MCNC: 2.7 G/DL (ref 3.5–5)
ALBUMIN/GLOB SERPL: 0.9 {RATIO} (ref 1–2.1)
ALT SERPL-CCNC: 137 U/L (ref 21–72)
AST SERPL-CCNC: 43 U/L (ref 17–59)
BUN SERPL-MCNC: 7 MG/DL (ref 9–20)
CALCIUM SERPL-MCNC: 7.9 MG/DL (ref 8.6–10.4)
ERYTHROCYTE [DISTWIDTH] IN BLOOD BY AUTOMATED COUNT: 13.9 % (ref 11.5–14.5)
GFR NON-AFRICAN AMERICAN: > 60
HEPATITIS A IGM: NEGATIVE
HEPATITIS B CORE AB: NEGATIVE
HEPATITIS C ANTIBODY: NEGATIVE
HGB BLD-MCNC: 12.7 G/DL (ref 12–18)
MCH RBC QN AUTO: 31.1 PG (ref 27–31)
MCHC RBC AUTO-ENTMCNC: 35.1 G/DL (ref 33–37)
MCV RBC AUTO: 88.5 FL (ref 80–94)
PLATELET # BLD: 490 K/UL (ref 130–400)
PMV BLD AUTO: 6.9 FL (ref 7.2–11.7)
RBC # BLD AUTO: 4.09 MIL/UL (ref 4.4–5.9)
WBC # BLD AUTO: 8.2 K/UL (ref 4.8–10.8)

## 2018-02-21 RX ADMIN — VANCOMYCIN HYDROCHLORIDE SCH MG: 500 INJECTION, POWDER, LYOPHILIZED, FOR SOLUTION INTRAVENOUS at 09:55

## 2018-02-21 RX ADMIN — FLUTICASONE PROPIONATE AND SALMETEROL SCH PUFF: 50; 250 POWDER RESPIRATORY (INHALATION) at 08:15

## 2018-02-21 RX ADMIN — VANCOMYCIN HYDROCHLORIDE SCH MG: 500 INJECTION, POWDER, LYOPHILIZED, FOR SOLUTION INTRAVENOUS at 17:52

## 2018-02-21 RX ADMIN — VANCOMYCIN HYDROCHLORIDE SCH MG: 500 INJECTION, POWDER, LYOPHILIZED, FOR SOLUTION INTRAVENOUS at 14:12

## 2018-02-21 RX ADMIN — VANCOMYCIN HYDROCHLORIDE SCH MG: 500 INJECTION, POWDER, LYOPHILIZED, FOR SOLUTION INTRAVENOUS at 21:35

## 2018-02-21 NOTE — CP.PCM.PN
Subjective





- Date & Time of Evaluation


Date of Evaluation: 02/21/18


Time of Evaluation: 08:35





- Subjective


Subjective: 





Pt feels well. No CP, no SOB, no edema, no cough, 


(+) nash dec diarrhea 2X since last night





Objective





- Vital Signs/Intake and Output


Vital Signs (last 24 hours): 


 











Temp Pulse Resp BP Pulse Ox


 


 98.2 F   77   20   91/60 L  96 


 


 02/21/18 08:12  02/21/18 08:12  02/21/18 08:12  02/21/18 08:12  02/21/18 08:12








Intake and Output: 


 











 02/21/18 02/21/18





 06:59 18:59


 


Intake Total 1520 800


 


Balance 1520 800














- Medications


Medications: 


 Current Medications





Heparin Sodium (Porcine) (Heparin)  5,000 units SC Q8 [facility]


   Last Admin: 02/21/18 05:13 Dose:  5,000 units


Sodium Chloride (Sodium Chloride 0.9%)  1,000 mls @ 100 mls/hr IV .Q10H [facility]


   Last Admin: 02/21/18 08:52 Dose:  Not Given


Isoniazid (Niazid)  300 mg PO DAILY [facility]


   Last Admin: 02/20/18 10:40 Dose:  300 mg


Losartan Potassium (Cozaar)  50 mg PO DAILY [facility]


   Last Admin: 02/20/18 10:41 Dose:  50 mg


Metronidazole (Flagyl)  500 mg PO TID [facility]


   Last Admin: 02/20/18 18:16 Dose:  500 mg


Pyridoxine HCl (Vitamin B6 50 Mg Tab)  50 mg PO DAILY [facility]


   Last Admin: 02/20/18 10:40 Dose:  50 mg


Rifampin (Rifampin Cap)  600 mg PO DAILY [facility]


   Last Admin: 02/20/18 10:40 Dose:  600 mg


Fluticasone/Salmeterol (Advair Diskus 250/50)  1 puff INH RQ12 [facility]


   Last Admin: 02/21/18 08:15 Dose:  1 puff


Vancomycin HCl (Vancocin (Oral Or Rectal Use))  125 mg PO QID [facility]


   Last Admin: 02/20/18 22:06 Dose:  125 mg











- Labs


Labs: 


 





 02/21/18 06:49 





 02/21/18 06:49 











- Constitutional


Appears: No Acute Distress





- Eye Exam


Eye Exam: Normal appearance





- ENT Exam


ENT Exam: Mucous Membranes Moist





- Neck Exam


Neck Exam: Full ROM.  absent: Lymphadenopathy, Normal Inspection





- Respiratory Exam


Respiratory Exam: Decreased Breath Sounds.  absent: Rales, Rhonchi, Wheezes





- Cardiovascular Exam


Cardiovascular Exam: +S1, +S2, Murmur.  absent: Gallop, REGULAR RHYTHM, JVD





- GI/Abdominal Exam


GI & Abdominal Exam: Soft.  absent: Tenderness, Mass





- Extremities Exam


Extremities Exam: Full ROM.  absent: Calf Tenderness, Joint Swelling, Normal 

Capillary Refill, Pedal Edema





Assessment and Plan





- Assessment and Plan (Free Text)


Assessment: 





C diff colitis; PTB


HTN, COPD





cont meds


discharge on Vanco??

## 2018-02-21 NOTE — CP.PCM.PN
Subjective





- Date & Time of Evaluation


Date of Evaluation: 02/21/18


Time of Evaluation: 11:10





- Subjective


Subjective: 





Less diarrhea since on Vancomycin


LFTs slightly improved-likely elevation from INH





Objective





- Vital Signs/Intake and Output


Vital Signs (last 24 hours): 


 











Temp Pulse Resp BP Pulse Ox


 


 98.2 F   77   20   91/60 L  96 


 


 02/21/18 08:12  02/21/18 08:12  02/21/18 08:12  02/21/18 08:12  02/21/18 08:12








Intake and Output: 


 











 02/21/18 02/21/18





 06:59 18:59


 


Intake Total 1520 800


 


Balance 1520 800














- Medications


Medications: 


 Current Medications





Heparin Sodium (Porcine) (Heparin)  5,000 units SC Q8 [facility]


   Last Admin: 02/21/18 05:13 Dose:  5,000 units


Sodium Chloride (Sodium Chloride 0.9%)  1,000 mls @ 100 mls/hr IV .Q10H [facility]


   Last Admin: 02/21/18 08:52 Dose:  Not Given


Isoniazid (Niazid)  300 mg PO DAILY [facility]


   Last Admin: 02/21/18 09:55 Dose:  300 mg


Losartan Potassium (Cozaar)  50 mg PO DAILY [facility]


   Last Admin: 02/21/18 09:14 Dose:  Not Given


Metronidazole (Flagyl)  500 mg PO TID [facility]


   Last Admin: 02/21/18 09:55 Dose:  500 mg


Pyridoxine HCl (Vitamin B6 50 Mg Tab)  50 mg PO DAILY [facility]


   Last Admin: 02/21/18 09:55 Dose:  50 mg


Rifampin (Rifampin Cap)  600 mg PO DAILY [facility]


   Last Admin: 02/21/18 09:55 Dose:  600 mg


Fluticasone/Salmeterol (Advair Diskus 250/50)  1 puff INH RQ12 [facility]


   Last Admin: 02/21/18 08:15 Dose:  1 puff


Vancomycin HCl (Vancocin (Oral Or Rectal Use))  125 mg PO QID [facility]


   Last Admin: 02/21/18 09:55 Dose:  125 mg











- Labs


Labs: 


 





 02/21/18 06:49 





 02/21/18 06:49 











- Constitutional


Appears: Well, No Acute Distress





- Respiratory Exam


Respiratory Exam: Clear to Ausculation Bilateral





- Cardiovascular Exam


Cardiovascular Exam: REGULAR RHYTHM





- GI/Abdominal Exam


GI & Abdominal Exam: Soft.  absent: Tenderness, Organomegaly





Assessment and Plan


(1) Abnormal LFTs


Assessment & Plan: 


Drug induced from INH


Monitor LFTs. If rising would recommend ID consult


Status: Acute   





(2) Colitis


Assessment & Plan: 


Pseudomembraneous colitis macroscopically


Diarrhea improving with oral Vancomycin


Monitor clinical response


Check pathology when available


Status: Acute   





(3) Tuberculosis


Assessment & Plan: 


Management per Dr Tariq


Status: Acute

## 2018-02-22 VITALS
TEMPERATURE: 98.4 F | OXYGEN SATURATION: 96 % | HEART RATE: 73 BPM | SYSTOLIC BLOOD PRESSURE: 110 MMHG | DIASTOLIC BLOOD PRESSURE: 66 MMHG

## 2018-02-22 RX ADMIN — VANCOMYCIN HYDROCHLORIDE SCH MG: 500 INJECTION, POWDER, LYOPHILIZED, FOR SOLUTION INTRAVENOUS at 09:06

## 2018-02-22 NOTE — CP.PCM.PN
Subjective





- Date & Time of Evaluation


Date of Evaluation: 02/22/18


Time of Evaluation: 09:00





- Subjective


Subjective: 





Alert, awake, no sob or chest pains.





Objective





- Vital Signs/Intake and Output


Vital Signs (last 24 hours): 


 











Temp Pulse Resp BP Pulse Ox


 


 98.4 F   73   20   110/66   96 


 


 02/22/18 08:25  02/22/18 08:25  02/22/18 08:25  02/22/18 08:25  02/22/18 08:25








Intake and Output: 


 











 02/22/18 02/22/18





 06:59 18:59


 


Intake Total 2600 


 


Balance 2600 














- Labs


Labs: 


 





 02/21/18 06:49 





 02/21/18 06:49 











Assessment and Plan





- Assessment and Plan (Free Text)


Assessment: 





Patient is seen and examined. Alert and orientedx3, denies abdominal pain, no 

diarrhea for 2 days. Discussed with DR Tariq, plan to discharge home on oral 

vancomycin to complete 14 days. Advised to follow up with PMD IN 1 WEEK.

## 2018-02-22 NOTE — CP.PCM.PN
Subjective





- Date & Time of Evaluation


Date of Evaluation: 02/22/18


Time of Evaluation: 08:13





- Subjective


Subjective: 





F/U diarrhea


Reports feels better.  Less diarrhea.  2 soft BMs per day.


Denies Rb, melena, fever, chills, SZ, cough, hematuria





Objective





- Vital Signs/Intake and Output


Vital Signs (last 24 hours): 


 











Temp Pulse Resp BP Pulse Ox


 


 98.8 F   65   20   102/63   95 


 


 02/22/18 00:00  02/22/18 00:00  02/22/18 00:00  02/22/18 00:00  02/22/18 00:00








Intake and Output: 


 











 02/22/18 02/22/18





 06:59 18:59


 


Intake Total 2600 


 


Balance 2600 














- Medications


Medications: 


 Current Medications





Heparin Sodium (Porcine) (Heparin)  5,000 units SC Q8 [facility]


   Last Admin: 02/22/18 05:30 Dose:  5,000 units


Sodium Chloride (Sodium Chloride 0.9%)  1,000 mls @ 100 mls/hr IV .Q10H [facility]


   Last Admin: 02/22/18 04:00 Dose:  100 mls/hr


Isoniazid (Niazid)  300 mg PO DAILY [facility]


   Last Admin: 02/21/18 09:55 Dose:  300 mg


Losartan Potassium (Cozaar)  50 mg PO DAILY [facility]


   Last Admin: 02/21/18 09:14 Dose:  Not Given


Metronidazole (Flagyl)  500 mg PO TID [facility]


   Last Admin: 02/21/18 17:51 Dose:  500 mg


Pyridoxine HCl (Vitamin B6 50 Mg Tab)  50 mg PO DAILY [facility]


   Last Admin: 02/21/18 09:55 Dose:  50 mg


Rifampin (Rifampin Cap)  600 mg PO DAILY [facility]


   Last Admin: 02/21/18 09:55 Dose:  600 mg


Fluticasone/Salmeterol (Advair Diskus 250/50)  1 puff INH RQ12 [facility]


   Last Admin: 02/21/18 08:15 Dose:  1 puff


Vancomycin HCl (Vancocin (Oral Or Rectal Use))  125 mg PO QID [facility]


   Last Admin: 02/21/18 21:35 Dose:  125 mg











- Labs


Labs: 


 





 02/21/18 06:49 





 02/21/18 06:49 











- Constitutional


Appears: Well





- Neck Exam


Neck Exam: absent: Tenderness





- Respiratory Exam


Respiratory Exam: Clear to Ausculation Bilateral





- Cardiovascular Exam


Cardiovascular Exam: RRR





- GI/Abdominal Exam


GI & Abdominal Exam: Soft, Normal Bowel Sounds.  absent: Guarding, Rigid, 

Tenderness, Mass, Rebound





- Neurological Exam


Neurological Exam: Alert, Oriented x3





Assessment and Plan


(1) Colitis


Assessment & Plan: 


pseudomembr.  Check biopsies


COntinue p.o. treatment for 2 weeks


Status: Acute   





(2) Hyponatremia


Status: Acute   





(3) Diarrhea


Status: Acute   





(4) Tuberculosis


Status: Chronic   





(5) Abnormal LFTs


Assessment & Plan: 


ABout same. COnsdier meds.  Cipro stopped.  On INH.  


Status: Acute